# Patient Record
Sex: FEMALE | Race: BLACK OR AFRICAN AMERICAN | NOT HISPANIC OR LATINO | Employment: OTHER | URBAN - METROPOLITAN AREA
[De-identification: names, ages, dates, MRNs, and addresses within clinical notes are randomized per-mention and may not be internally consistent; named-entity substitution may affect disease eponyms.]

---

## 2017-01-16 ENCOUNTER — TELEPHONE (OUTPATIENT)
Dept: OPHTHALMOLOGY | Facility: CLINIC | Age: 72
End: 2017-01-16

## 2017-01-16 NOTE — TELEPHONE ENCOUNTER
Called insurance and conferred with Tania TEJEDA about a prior authorization for MsJeison Rory. Prior authorization is currently in the works, but Tania said prior auth. worked.

## 2017-01-17 ENCOUNTER — TELEPHONE (OUTPATIENT)
Dept: OPHTHALMOLOGY | Facility: CLINIC | Age: 72
End: 2017-01-17

## 2017-01-17 NOTE — TELEPHONE ENCOUNTER
----- Message from Cristina Machado sent at 1/17/2017  2:49 PM CST -----  Contact:  [facility] authorization   [facility] is juany Ute phone call. ...[phone] ref#[id number]

## 2017-01-17 NOTE — TELEPHONE ENCOUNTER
----- Message from Zandra Clarke sent at 1/17/2017  9:18 AM CST -----  Erica with [employer] at [phone]//ref # is [id number]//has questions regarding prescription for med/Xiidra//please call//thanks/[facility]

## 2017-01-18 ENCOUNTER — OFFICE VISIT (OUTPATIENT)
Dept: OPHTHALMOLOGY | Facility: CLINIC | Age: 72
End: 2017-01-18
Payer: MEDICARE

## 2017-01-18 DIAGNOSIS — H52.203 MYOPIA WITH ASTIGMATISM AND PRESBYOPIA, BILATERAL: Primary | ICD-10-CM

## 2017-01-18 DIAGNOSIS — H35.373 EPIRETINAL MEMBRANE, BILATERAL: ICD-10-CM

## 2017-01-18 DIAGNOSIS — H52.4 MYOPIA WITH ASTIGMATISM AND PRESBYOPIA, BILATERAL: Primary | ICD-10-CM

## 2017-01-18 DIAGNOSIS — H52.13 MYOPIA WITH ASTIGMATISM AND PRESBYOPIA, BILATERAL: Primary | ICD-10-CM

## 2017-01-18 PROCEDURE — 92015 DETERMINE REFRACTIVE STATE: CPT | Mod: ,,, | Performed by: OPTOMETRIST

## 2017-01-18 PROCEDURE — 99212 OFFICE O/P EST SF 10 MIN: CPT | Mod: PBBFAC,PO,25 | Performed by: OPTOMETRIST

## 2017-01-18 PROCEDURE — 92134 CPTRZ OPH DX IMG PST SGM RTA: CPT | Mod: PBBFAC,PO | Performed by: OPTOMETRIST

## 2017-01-18 PROCEDURE — 99999 PR PBB SHADOW E&M-EST. PATIENT-LVL II: CPT | Mod: PBBFAC,,, | Performed by: OPTOMETRIST

## 2017-01-18 PROCEDURE — 99499 UNLISTED E&M SERVICE: CPT | Mod: S$PBB,,, | Performed by: OPTOMETRIST

## 2017-01-18 RX ORDER — NALOXEGOL OXALATE 25 MG/1
TABLET, FILM COATED ORAL
COMMUNITY
Start: 2016-10-20 | End: 2019-11-12 | Stop reason: CLARIF

## 2017-01-18 RX ORDER — MISOPROSTOL 100 UG/1
TABLET ORAL
COMMUNITY
Start: 2016-12-23 | End: 2019-09-23

## 2017-01-18 RX ORDER — DICYCLOMINE HYDROCHLORIDE 10 MG/1
10 CAPSULE ORAL 3 TIMES DAILY
COMMUNITY
Start: 2016-10-24

## 2017-01-18 NOTE — TELEPHONE ENCOUNTER
[employer] called again. They just needed to know if the patient has ever tried restasis. I let them know it was previously listed in her medications.     Ute

## 2017-01-18 NOTE — PROGRESS NOTES
HPI     Pt here for 2-3 wk refraction. Pt has [arts of her vision missing in her   right eye. It's like something is in front of her vision.        Last edited by Stew Miranda, Patient Care Assistant on 1/18/2017 11:51   AM.         Assessment /Plan     For exam results, see Encounter Report.    Myopia with astigmatism and presbyopia, bilateral    Epiretinal membrane, bilateral  -     Posterior Segment OCT Retina-Both eyes    Eyeglass Final Rx     Eyeglass Final Rx      Sphere Cylinder Axis Add   Right -3.50 +2.00 085 +2.50   Left -2.50 +1.25 070 +2.50       Expiration Date:  1/19/2018              No changes on mOCT today compared with previous studies  RTC as scheduled with Dr babb for yearly exam, PRN sooner if any problems or concerns  Discussed above and all questions were answered.

## 2017-06-13 ENCOUNTER — OFFICE VISIT (OUTPATIENT)
Dept: OPHTHALMOLOGY | Facility: CLINIC | Age: 72
End: 2017-06-13
Payer: MEDICARE

## 2017-06-13 DIAGNOSIS — H04.123 DRY EYE SYNDROME, BILATERAL: ICD-10-CM

## 2017-06-13 DIAGNOSIS — H40.003 GLAUCOMA SUSPECT, BOTH EYES: ICD-10-CM

## 2017-06-13 DIAGNOSIS — E11.9 TYPE 2 DIABETES MELLITUS WITHOUT COMPLICATION, WITHOUT LONG-TERM CURRENT USE OF INSULIN: Primary | ICD-10-CM

## 2017-06-13 DIAGNOSIS — H35.371 EPIRETINAL MEMBRANE, RIGHT EYE: ICD-10-CM

## 2017-06-13 PROCEDURE — 99999 PR PBB SHADOW E&M-EST. PATIENT-LVL III: CPT | Mod: PBBFAC,,, | Performed by: OPHTHALMOLOGY

## 2017-06-13 PROCEDURE — 99213 OFFICE O/P EST LOW 20 MIN: CPT | Mod: PBBFAC,PO | Performed by: OPHTHALMOLOGY

## 2017-06-13 PROCEDURE — 92014 COMPRE OPH EXAM EST PT 1/>: CPT | Mod: S$PBB,,, | Performed by: OPHTHALMOLOGY

## 2017-06-13 NOTE — PROGRESS NOTES
===============================  06/14/2017   Faith Valadez,   71 y.o. female   Last visit [facility]: :8/8/2016   Last visit eye dept. 1/18/2017  [place]:  Corrected distance visual acuity was 20/100 in the right eye and 20/30 in the left eye.  Tonometry     Tonometry (Applanation, 2:51 PM)       Right Left    Pressure 16 16              Wearing Rx     Wearing Rx       Sphere Cylinder Axis Add    Right -3.50 +2.00 85 +2.75    Left -2.50 +1.25 85 +2.75    Type:  Trifocal              Manifest Refraction     Manifest Refraction       Sphere Cylinder [place] Dist VA    Right -3.50 +2.25 085 20/80    Left -2.25 +1.25 085 20/25              Chief Complaint   Patient presents with    Diabetic Eye Exam     YEARLY DIABETIC EXAM//  pt states that her eyes get crusty in the mornings and during the day        HPI     Diabetic Eye Exam    Additional comments: YEARLY DIABETIC EXAM//  pt states that her eyes get   crusty in the mornings and during the day           Comments   --Dm x since 2009  --erm od (per Dr. Lo If ever has ERM peel would be best to have cat   surg & iol 1st.)  Saw Dr. Lo also 2007 and patient decided against surgery  --Dry eyes  Punctal plugs (oasis) 6/16/14  restasis  --Ns ou  --pvd ou  --S. COAG based on ^c:d all diagnostics wnl (previously by Dr. Breaux)  cct 537/ 540   HVF 11/26/10  tmax 20 OU       Last edited by Malena Tai on 6/13/2017  2:42 PM. (History)      Read Studies:   Vitals  ________________  6/13/2017  Problem List Items Addressed This Visit        Eye/Vision problems    Epiretinal membrane, right eye    Dry eye syndrome - Both Eyes    Type 2 diabetes mellitus without complication - Primary       Other    Glaucoma suspect, both eyes      Other Visit Diagnoses    None.       .  1m ns   od erm no surgery  deecision 2007   rtc 1 year         ===========================

## 2017-11-30 ENCOUNTER — OFFICE VISIT (OUTPATIENT)
Dept: OPHTHALMOLOGY | Facility: CLINIC | Age: 72
End: 2017-11-30
Payer: MEDICARE

## 2017-11-30 DIAGNOSIS — H01.02B SQUAMOUS BLEPHARITIS OF BOTH UPPER AND LOWER EYELID OF LEFT EYE: Primary | ICD-10-CM

## 2017-11-30 DIAGNOSIS — H52.13 MYOPIA WITH ASTIGMATISM AND PRESBYOPIA, BILATERAL: ICD-10-CM

## 2017-11-30 DIAGNOSIS — H52.203 MYOPIA WITH ASTIGMATISM AND PRESBYOPIA, BILATERAL: ICD-10-CM

## 2017-11-30 DIAGNOSIS — H04.123 DRY EYE SYNDROME, BILATERAL: ICD-10-CM

## 2017-11-30 DIAGNOSIS — H52.4 MYOPIA WITH ASTIGMATISM AND PRESBYOPIA, BILATERAL: ICD-10-CM

## 2017-11-30 PROCEDURE — 99999 PR PBB SHADOW E&M-EST. PATIENT-LVL II: CPT | Mod: PBBFAC,,, | Performed by: OPTOMETRIST

## 2017-11-30 PROCEDURE — 92015 DETERMINE REFRACTIVE STATE: CPT | Mod: ,,, | Performed by: OPTOMETRIST

## 2017-11-30 PROCEDURE — 92012 INTRM OPH EXAM EST PATIENT: CPT | Mod: S$PBB,,, | Performed by: OPTOMETRIST

## 2017-11-30 PROCEDURE — 99212 OFFICE O/P EST SF 10 MIN: CPT | Mod: PBBFAC,PO | Performed by: OPTOMETRIST

## 2017-11-30 RX ORDER — OXYBUTYNIN CHLORIDE 5 MG/1
TABLET ORAL
COMMUNITY
Start: 2017-09-21 | End: 2018-09-21

## 2017-11-30 RX ORDER — PROMETHAZINE HYDROCHLORIDE AND DEXTROMETHORPHAN HYDROBROMIDE 6.25; 15 MG/5ML; MG/5ML
SYRUP ORAL
COMMUNITY
Start: 2017-11-10 | End: 2018-09-21

## 2017-11-30 RX ORDER — SOLIFENACIN SUCCINATE 5 MG/1
TABLET, FILM COATED ORAL
COMMUNITY
Start: 2017-11-06 | End: 2019-11-12 | Stop reason: CLARIF

## 2017-11-30 RX ORDER — TOBRAMYCIN AND DEXAMETHASONE 3; 1 MG/ML; MG/ML
1 SUSPENSION/ DROPS OPHTHALMIC 3 TIMES DAILY
Qty: 1 BOTTLE | Refills: 0 | Status: SHIPPED | OUTPATIENT
Start: 2017-11-30 | End: 2017-12-07

## 2017-11-30 RX ORDER — VALACYCLOVIR HYDROCHLORIDE 1 G/1
TABLET, FILM COATED ORAL
COMMUNITY
Start: 2017-11-10 | End: 2019-09-23

## 2017-11-30 NOTE — PROGRESS NOTES
HPI     Eye Problem    Additional comments: Itchy, irritated, red, tearing eyes           Comments   Last seen by [facility] on 6/13/17 for yearly DM exam. Patient here today c/o   red, irritated, tearing, itchy eyes cause her vision to be blurred.   Patient states she has shingles on her back.   1. Dry Eyes OU  Pain Scale:  0-1  Onset:   2 months  OD, OS, OU:   OU OS>OD  Discharge:   Yes  A.M. Matting:  No  Itch:   Yes  Redness:   Yes  Photophobia:   No  Foreign body sensation:   No  Deep pain:   No  Previous occurrence:   Yes  Drops:   Xiidra       Last edited by Cami George, PCT on 11/30/2017 10:15 AM.   (History)            Assessment /Plan     For exam results, see Encounter Report.    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  Dispense: 1 Bottle; Refill: 0  Blepharoconjunctivitis OS  Start tobradex tid OS x 1 week, then d/c  RTC or call if symptoms worsen or if not resolved x 1 week    Dry eye syndrome, bilateral  -     lifitegrast 5 % Dpet; Place 1 drop into both eyes 2 (two) times daily.  Dispense: 60 each; Refill: 6    Myopia with astigmatism and presbyopia, bilateral  Eyeglass Final Rx     Eyeglass Final Rx       Sphere Cylinder Axis Dist VA Add    Right -3.50 +2.00 090 20/80-1 +2.75    Left -2.50 +1.25 085 20/25 +2.75    Type:  Trifocal    Expiration Date:  12/1/2018              RTC PRN or as scheduled  Discussed above and all questions were answered.

## 2018-09-21 ENCOUNTER — OFFICE VISIT (OUTPATIENT)
Dept: OPHTHALMOLOGY | Facility: CLINIC | Age: 73
End: 2018-09-21
Payer: MEDICARE

## 2018-09-21 DIAGNOSIS — E11.9 TYPE 2 DIABETES MELLITUS WITHOUT COMPLICATION, WITHOUT LONG-TERM CURRENT USE OF INSULIN: Primary | ICD-10-CM

## 2018-09-21 PROCEDURE — 99999 PR PBB SHADOW E&M-EST. PATIENT-LVL II: CPT | Mod: PBBFAC,,, | Performed by: OPHTHALMOLOGY

## 2018-09-21 PROCEDURE — 92014 COMPRE OPH EXAM EST PT 1/>: CPT | Mod: S$PBB,,, | Performed by: OPHTHALMOLOGY

## 2018-09-21 PROCEDURE — 99212 OFFICE O/P EST SF 10 MIN: CPT | Mod: PBBFAC,PO | Performed by: OPHTHALMOLOGY

## 2018-09-21 RX ORDER — LIDOCAINE 50 MG/G
PATCH TOPICAL
COMMUNITY
Start: 2018-07-01

## 2018-09-21 NOTE — PROGRESS NOTES
===============================  09/21/2018   Faith Valadez,   73 y.o. female   Last visit [facility]: :6/13/2017   Last visit eye dept. Visit date not found  [place]:  Corrected distance visual acuity was 20/50 in the right eye and 20/25 in the left eye.  Tonometry     Tonometry (Applanation, 8:59 AM)       Right Left    Pressure 17 16              Wearing Rx     Wearing Rx       Sphere Cylinder Axis Add    Right +3.75 +1.50 080 +2.75    Left -3.00 +1.25 090 +2.75    Type:  Trifocal               Not recorded        Chief Complaint   Patient presents with    Diabetes     YEARLY EXAM    ERM        HPI     Diabetes      Additional comments: YEARLY EXAM              Comments     --Dm x since 2009  --erm od (per Dr. Lo If ever has ERM peel would be best to have cat   surg & iol 1st.)  Saw Dr. Lo also 2007 and patient decided against surgery  --Dry eyes  Punctal plugs (oasis) 6/16/14  restasis  --Ns ou  --pvd ou  --S. COAG based on ^c:d all diagnostics wnl (previously by Dr. Breaux)  cct 537/ 540   HVF 11/26/10  tmax 20 OU          Last edited by Malena Tai on 9/21/2018  8:45 AM. (History)          ________________  9/21/2018  Problem List Items Addressed This Visit        Eye/Vision problems    Type 2 diabetes mellitus without complication - Primary        od erm stable    .sytomtioc    Near symptoms  ERM unchanged OD  1+ NSC OU, follow  rx change  rtc 1 year         ===========================

## 2019-05-29 ENCOUNTER — OFFICE VISIT (OUTPATIENT)
Dept: OPHTHALMOLOGY | Facility: CLINIC | Age: 74
End: 2019-05-29
Payer: MEDICARE

## 2019-05-29 DIAGNOSIS — H04.123 DRY EYE SYNDROME, BILATERAL: Primary | ICD-10-CM

## 2019-05-29 PROCEDURE — 99999 PR PBB SHADOW E&M-EST. PATIENT-LVL II: ICD-10-PCS | Mod: PBBFAC,,, | Performed by: OPTOMETRIST

## 2019-05-29 PROCEDURE — 92012 PR EYE EXAM, EST PATIENT,INTERMED: ICD-10-PCS | Mod: S$PBB,,, | Performed by: OPTOMETRIST

## 2019-05-29 PROCEDURE — 99999 PR PBB SHADOW E&M-EST. PATIENT-LVL II: CPT | Mod: PBBFAC,,, | Performed by: OPTOMETRIST

## 2019-05-29 PROCEDURE — 99212 OFFICE O/P EST SF 10 MIN: CPT | Mod: PBBFAC,PN | Performed by: OPTOMETRIST

## 2019-05-29 PROCEDURE — 92012 INTRM OPH EXAM EST PATIENT: CPT | Mod: S$PBB,,, | Performed by: OPTOMETRIST

## 2019-05-29 RX ORDER — LOTEPREDNOL ETABONATE 5 MG/ML
1 SUSPENSION/ DROPS OPHTHALMIC 3 TIMES DAILY
Qty: 5 ML | Refills: 1 | Status: SHIPPED | OUTPATIENT
Start: 2019-05-29 | End: 2019-06-12

## 2019-05-29 NOTE — PROGRESS NOTES
HPI     Last [facility] exam 09/21/2018  Chief complaint: irritation OU   Onset: 1 month ago  Left eye is red   Dry   No watering  No matting in the AM   Blurred vision   Has been using Xiidra but is out and needs a refill  Patient is currently using Thera Tears             Dry eyes   punctal plugs   Diabetic/NIDDM    She has been treated by Ricky for dry eye with xiidra, restasis,   lotemax, and AT.    Last edited by ADRIAN Lott, OD on 5/29/2019  9:34 AM. (History)            Assessment /Plan     For exam results, see Encounter Report.    Dry eye syndrome, bilateral  -     lifitegrast (XIIDRA) 5 % Dpet; Place 1 drop into both eyes 2 (two) times daily.  Dispense: 60 each; Refill: 6  -     loteprednol (LOTEMAX) 0.5 % ophthalmic suspension; Place 1 drop into both eyes 3 (three) times daily. for 14 days  Dispense: 5 mL; Refill: 1      Dry eye with PEK.  Drops above as Rxed.  RTC prn.

## 2019-07-10 ENCOUNTER — OFFICE VISIT (OUTPATIENT)
Dept: OPHTHALMOLOGY | Facility: CLINIC | Age: 74
End: 2019-07-10
Payer: MEDICARE

## 2019-07-10 DIAGNOSIS — H04.123 DRY EYE SYNDROME, BILATERAL: Primary | ICD-10-CM

## 2019-07-10 PROCEDURE — 92012 PR EYE EXAM, EST PATIENT,INTERMED: ICD-10-PCS | Mod: S$PBB,,, | Performed by: OPTOMETRIST

## 2019-07-10 PROCEDURE — 99999 PR PBB SHADOW E&M-EST. PATIENT-LVL II: ICD-10-PCS | Mod: PBBFAC,,, | Performed by: OPTOMETRIST

## 2019-07-10 PROCEDURE — 92012 INTRM OPH EXAM EST PATIENT: CPT | Mod: S$PBB,,, | Performed by: OPTOMETRIST

## 2019-07-10 PROCEDURE — 99212 OFFICE O/P EST SF 10 MIN: CPT | Mod: PBBFAC | Performed by: OPTOMETRIST

## 2019-07-10 PROCEDURE — 99999 PR PBB SHADOW E&M-EST. PATIENT-LVL II: CPT | Mod: PBBFAC,,, | Performed by: OPTOMETRIST

## 2019-07-10 NOTE — PROGRESS NOTES
HPI     Last seen by [facility] on 5/29/19 for Dry eye OU  Patient here today for Dry eye OU  Symptoms have been present for several months  Left eye is red  No tearing  No matting  Used Xiidra and Lotemax Patient states both drops are too expensive  Patient states she uses B&L drops    Dry eyes  Punctal plugs  DM    Last edited by Cami George, PCT on 7/10/2019  8:19 AM. (History)              Assessment /Plan     For exam results, see Encounter Report.    Dry eye syndrome, bilateral      Try Genteal Gel drops qid OU.  RTC to DNL if s/s persist.

## 2019-09-23 ENCOUNTER — OFFICE VISIT (OUTPATIENT)
Dept: OPHTHALMOLOGY | Facility: CLINIC | Age: 74
End: 2019-09-23
Payer: MEDICARE

## 2019-09-23 ENCOUNTER — TELEPHONE (OUTPATIENT)
Dept: OPHTHALMOLOGY | Facility: CLINIC | Age: 74
End: 2019-09-23

## 2019-09-23 DIAGNOSIS — H35.371 EPIRETINAL MEMBRANE, RIGHT EYE: Primary | ICD-10-CM

## 2019-09-23 DIAGNOSIS — E11.9 TYPE 2 DIABETES MELLITUS WITHOUT COMPLICATION, WITHOUT LONG-TERM CURRENT USE OF INSULIN: ICD-10-CM

## 2019-09-23 PROCEDURE — 99999 PR PBB SHADOW E&M-EST. PATIENT-LVL II: ICD-10-PCS | Mod: PBBFAC,,, | Performed by: OPHTHALMOLOGY

## 2019-09-23 PROCEDURE — 92014 PR EYE EXAM, EST PATIENT,COMPREHESV: ICD-10-PCS | Mod: S$PBB,,, | Performed by: OPHTHALMOLOGY

## 2019-09-23 PROCEDURE — 99999 PR PBB SHADOW E&M-EST. PATIENT-LVL II: CPT | Mod: PBBFAC,,, | Performed by: OPHTHALMOLOGY

## 2019-09-23 PROCEDURE — 92014 COMPRE OPH EXAM EST PT 1/>: CPT | Mod: S$PBB,,, | Performed by: OPHTHALMOLOGY

## 2019-09-23 PROCEDURE — 99212 OFFICE O/P EST SF 10 MIN: CPT | Mod: PBBFAC | Performed by: OPHTHALMOLOGY

## 2019-09-23 RX ORDER — METOPROLOL SUCCINATE 100 MG/1
100 TABLET, EXTENDED RELEASE ORAL 2 TIMES DAILY
Refills: 11 | COMMUNITY
Start: 2019-07-07 | End: 2023-02-27

## 2019-09-23 RX ORDER — ROSUVASTATIN CALCIUM 40 MG/1
40 TABLET, COATED ORAL NIGHTLY
Refills: 3 | COMMUNITY
Start: 2019-07-06 | End: 2024-01-11

## 2019-09-23 NOTE — TELEPHONE ENCOUNTER
----- Message from EVE Rey MD sent at 9/23/2019 10:55 AM CDT -----  Please contact Ms. Valadez for consult.  Thank you.

## 2019-09-23 NOTE — PROGRESS NOTES
===============================  Faith Valadez,   74 y.o. female   Last visit [facility]: :9/21/2018   Last visit eye dept. 7/10/2019  [place]:  Corrected distance visual acuity was 20/100 in the right eye and 20/25 in the left eye.  Tonometry     Tonometry (Applanation, 9:56 AM)       Right Left    Pressure 11 15               Not recorded        Manifest Refraction     Manifest Refraction       Sphere Cylinder [place] Dist VA    Right -4.00 +1.50 080 20/80    Left -1.75 +1.25 090 20/20               Not recorded        Chief Complaint   Patient presents with    Diabetes     YEARLY EXAM    ERM          ________________  9/23/2019  HPI     Diabetes      Additional comments: YEARLY EXAM              Comments     --Dm x since 2009  --erm od (per Dr. Lo If ever has ERM peel would be best to have cat   surg & iol 1st.)  Saw Dr. Lo also 2007 and patient decided against surgery  --Dry eyes  Punctal plugs (oasis) 6/16/14  restasis  --Ns ou  --pvd ou  --S. COAG based on ^c:d all diagnostics wnl (previously by Dr. Breaux)  cct 537/ 540   HVF 11/26/10  tmax 20 OU          Last edited by Malena Tai on 9/23/2019  9:37 AM. (History)      Problem List Items Addressed This Visit        Eye/Vision problems    Epiretinal membrane, right eye - Primary  --Patient is symptomatic and would now like surgery  Recommend consult with Dr. Corea    Type 2 diabetes mellitus without complication  --no bdr        od marked erm    worse   os ok  min ns  Sl worse closes od most of the day   consut dr lombardo    Requesting papers filled out for disability  Explained she is not visually disabled    .       ===========================

## 2019-10-30 ENCOUNTER — TELEPHONE (OUTPATIENT)
Dept: OPHTHALMOLOGY | Facility: CLINIC | Age: 74
End: 2019-10-30

## 2019-10-30 ENCOUNTER — OFFICE VISIT (OUTPATIENT)
Dept: OPHTHALMOLOGY | Facility: CLINIC | Age: 74
End: 2019-10-30
Payer: MEDICARE

## 2019-10-30 DIAGNOSIS — H35.371 RIGHT EPIRETINAL MEMBRANE: Primary | ICD-10-CM

## 2019-10-30 DIAGNOSIS — H43.813 POSTERIOR VITREOUS DETACHMENT OF BOTH EYES: ICD-10-CM

## 2019-10-30 DIAGNOSIS — H35.371 EPIRETINAL MEMBRANE, RIGHT EYE: Primary | ICD-10-CM

## 2019-10-30 PROCEDURE — 99999 PR PBB SHADOW E&M-EST. PATIENT-LVL II: CPT | Mod: PBBFAC,,, | Performed by: OPHTHALMOLOGY

## 2019-10-30 PROCEDURE — 92014 COMPRE OPH EXAM EST PT 1/>: CPT | Mod: S$PBB,,, | Performed by: OPHTHALMOLOGY

## 2019-10-30 PROCEDURE — 92014 PR EYE EXAM, EST PATIENT,COMPREHESV: ICD-10-PCS | Mod: S$PBB,,, | Performed by: OPHTHALMOLOGY

## 2019-10-30 PROCEDURE — 99212 OFFICE O/P EST SF 10 MIN: CPT | Mod: PBBFAC,25 | Performed by: OPHTHALMOLOGY

## 2019-10-30 PROCEDURE — 92134 CPTRZ OPH DX IMG PST SGM RTA: CPT | Mod: PBBFAC | Performed by: OPHTHALMOLOGY

## 2019-10-30 PROCEDURE — 99999 PR PBB SHADOW E&M-EST. PATIENT-LVL II: ICD-10-PCS | Mod: PBBFAC,,, | Performed by: OPHTHALMOLOGY

## 2019-10-30 PROCEDURE — 92134 POSTERIOR SEGMENT OCT RETINA (OCULAR COHERENCE TOMOGRAPHY)-BOTH EYES: ICD-10-PCS | Mod: 26,S$PBB,, | Performed by: OPHTHALMOLOGY

## 2019-10-30 PROCEDURE — 92225 PR SPECIAL EYE EXAM, INITIAL: ICD-10-PCS | Mod: 50,S$PBB,, | Performed by: OPHTHALMOLOGY

## 2019-10-30 PROCEDURE — 92225 PR SPECIAL EYE EXAM, INITIAL: CPT | Mod: 50,PBBFAC | Performed by: OPHTHALMOLOGY

## 2019-10-30 PROCEDURE — 92225 PR SPECIAL EYE EXAM, INITIAL: CPT | Mod: 50,S$PBB,, | Performed by: OPHTHALMOLOGY

## 2019-10-30 RX ORDER — NYSTATIN 100000 U/G
CREAM TOPICAL
COMMUNITY
Start: 2018-01-31

## 2019-10-30 RX ORDER — ACETAMINOPHEN 500 MG
5000 TABLET ORAL DAILY
COMMUNITY

## 2019-10-30 RX ORDER — AZELASTINE HYDROCHLORIDE, FLUTICASONE PROPIONATE 137; 50 UG/1; UG/1
2 SPRAY, METERED NASAL 2 TIMES DAILY PRN
COMMUNITY

## 2019-10-30 RX ORDER — NAPROXEN SODIUM 220 MG/1
81 TABLET, FILM COATED ORAL DAILY
COMMUNITY

## 2019-10-30 NOTE — PROGRESS NOTES
HPI     Diabetes      Additional comments: consult per               Comments     --Dm x since 2009  --erm od (per Dr. Lo If ever has ERM peel would be best to have cat   surg & iol 1st.)  Saw Dr. Lo also 2007 and patient decided against surgery  --Dry eyes  Punctal plugs (oasis) 6/16/14  restasis  --Ns ou  --pvd ou  --S. COAG based on ^c:d all diagnostics wnl (previously by Dr. Breaux)  cct 537/ 540   HVF 11/26/10  tmax 20 OU          Last edited by Malena Tai on 10/30/2019 10:01 AM. (History)        OCT - ERM OD with foveal distortion      A/P    1. ERM OD  Significant macular distortion with worsening Va from 20/50 to 20/200 over last year     Would benefit from PPV to get to 20/50 level again    Plan 25g PPV/ILM peel/AFx OD for ERM    Local MAC  LOC 40 min    Risks, benefits, and alternatives to treatment discussed in detail with the patient.  The patient voiced understanding and wished to proceed with the procedure    2. NS OU  Will need CE OD shortly after PPV    3. HTN Ret OU  BP control      TO OR

## 2019-11-12 RX ORDER — OXYBUTYNIN CHLORIDE 5 MG/1
5 TABLET, EXTENDED RELEASE ORAL DAILY
COMMUNITY

## 2019-11-12 NOTE — H&P
"Pre-Operative History & Physical  Ophthalmology      SUBJECTIVE:     History of Present Illness:  Patient is a 74 y.o. female presents with Right epiretinal membrane [H35.371].    MEDICATIONS:   No medications prior to admission.       ALLERGIES:   Review of patient's allergies indicates:   Allergen Reactions    Codeine Shortness Of Breath, Nausea Only and Rash     Reports throat "tightening".    Gabapentin Hallucinations    Amoxicillin Hives    Latex, natural rubber Itching and Rash    Penicillins Hives       PAST MEDICAL HISTORY:   Past Medical History:   Diagnosis Date    Arthritis     Cataract     Diabetes mellitus     Hypertension     Open angle with borderline findings, low risk 5/25/2015     PAST SURGICAL HISTORY: No past surgical history on file.  PAST FAMILY HISTORY:   Family History   Problem Relation Age of Onset    Glaucoma Father     Cataracts Father     Cancer Father     Diabetes Sister     Cataracts Sister     Cancer Sister     Hypertension Sister     Thyroid disease Brother     Thyroid disease Maternal Aunt     Hypertension Maternal Uncle     Stroke Maternal Grandmother     Diabetes Maternal Grandfather     Stroke Maternal Grandfather     Thyroid disease Paternal Grandmother     Glaucoma Son     Amblyopia Neg Hx     Blindness Neg Hx     Strabismus Neg Hx     Retinal detachment Neg Hx     Macular degeneration Neg Hx      SOCIAL HISTORY:   Social History     Tobacco Use    Smoking status: Never Smoker   Substance Use Topics    Alcohol use: No    Drug use: Not on file        MENTAL STATUS: Alert    REVIEW OF SYSTEMS: Negative    OBJECTIVE:     Vital Signs (Most Recent)       Physical Exam:  General: NAD  HEENT: Atraumatic  Lungs: Adequate respirations, LCTAB  Heart: RRR, No murmur  Abdomen: Soft NT    ASSESSMENT/PLAN:     Patient is a 74 y.o. female with Right epiretinal membrane [H35.371].     - Plan for surgical correction 25g PPV/ILM peel/AFx OD for ERM OD     Local " MAC  LOC 40 min   - Risks/benefits/alternatives of the procedure including, but not limited to scarring, bleeding, infection, loss or decreased vision, and/or need for possible repeat surgery discussed with the patient and family.   - Informed consent obtained prior to surgery and the patient/family voiced good understanding.    Chris Streeter  11/12/2019  4:15 PM

## 2019-11-12 NOTE — PRE-PROCEDURE INSTRUCTIONS
Preop instructions:     NPO instructions: NO solids foods,non-clear liquids including milk, milk products, creamers, gum, mints, or hard candy after midnight the night prior to your surgery.     We encourage a limited consumption of CLEAR LIQUIDS after midnight the night before your surgery.     Acceptable Clear Liquids are listed below:    Water, SUGAR-FREE Gatorade/Powerade sports drinks,  Black coffee with without sugar/NO milk, cream or creamer or Jello.     If you have received specific instructions from your Surgeon/Surgeon's staff, please follow their instructions.     Showering instructions, directions, leave all valuables at home, medication instructions for PM prior & am of procedure explained. Patient stated an understanding.      Patient denies any side effects or issues with anesthesia or sedation.                                                                                                                                    Patient does not know arrival time. Explained that this information comes from the surgeons office and if they have not heard from them by 2 pm, to call office. Patient stated an understanding.

## 2019-11-13 ENCOUNTER — ANESTHESIA EVENT (OUTPATIENT)
Dept: SURGERY | Facility: HOSPITAL | Age: 74
End: 2019-11-13
Payer: MEDICARE

## 2019-11-13 ENCOUNTER — HOSPITAL ENCOUNTER (OUTPATIENT)
Facility: HOSPITAL | Age: 74
Discharge: HOME | End: 2019-11-13
Attending: OPHTHALMOLOGY | Admitting: OPHTHALMOLOGY
Payer: MEDICARE

## 2019-11-13 ENCOUNTER — ANESTHESIA (OUTPATIENT)
Dept: SURGERY | Facility: HOSPITAL | Age: 74
End: 2019-11-13
Payer: MEDICARE

## 2019-11-13 VITALS
OXYGEN SATURATION: 98 % | DIASTOLIC BLOOD PRESSURE: 66 MMHG | RESPIRATION RATE: 16 BRPM | HEART RATE: 57 BPM | SYSTOLIC BLOOD PRESSURE: 133 MMHG | HEIGHT: 62 IN | WEIGHT: 196 LBS | TEMPERATURE: 98 F | BODY MASS INDEX: 36.07 KG/M2

## 2019-11-13 DIAGNOSIS — H35.371 EPIRETINAL MEMBRANE, RIGHT EYE: Primary | ICD-10-CM

## 2019-11-13 LAB
POCT GLUCOSE: 79 MG/DL (ref 70–110)
POCT GLUCOSE: 89 MG/DL (ref 70–110)

## 2019-11-13 PROCEDURE — D9220A PRA ANESTHESIA: ICD-10-PCS | Mod: CRNA,,, | Performed by: NURSE ANESTHETIST, CERTIFIED REGISTERED

## 2019-11-13 PROCEDURE — 37000009 HC ANESTHESIA EA ADD 15 MINS: Performed by: OPHTHALMOLOGY

## 2019-11-13 PROCEDURE — 63600175 PHARM REV CODE 636 W HCPCS: Performed by: NURSE ANESTHETIST, CERTIFIED REGISTERED

## 2019-11-13 PROCEDURE — 67042 VIT FOR MACULAR HOLE: CPT | Mod: RT,GC,, | Performed by: OPHTHALMOLOGY

## 2019-11-13 PROCEDURE — 27201423 OPTIME MED/SURG SUP & DEVICES STERILE SUPPLY: Performed by: OPHTHALMOLOGY

## 2019-11-13 PROCEDURE — 82962 GLUCOSE BLOOD TEST: CPT | Performed by: OPHTHALMOLOGY

## 2019-11-13 PROCEDURE — 63600175 PHARM REV CODE 636 W HCPCS: Performed by: OPHTHALMOLOGY

## 2019-11-13 PROCEDURE — S0020 INJECTION, BUPIVICAINE HYDRO: HCPCS | Performed by: OPHTHALMOLOGY

## 2019-11-13 PROCEDURE — 36000707: Performed by: OPHTHALMOLOGY

## 2019-11-13 PROCEDURE — 36000706: Performed by: OPHTHALMOLOGY

## 2019-11-13 PROCEDURE — 25000003 PHARM REV CODE 250

## 2019-11-13 PROCEDURE — 71000015 HC POSTOP RECOV 1ST HR: Performed by: OPHTHALMOLOGY

## 2019-11-13 PROCEDURE — D9220A PRA ANESTHESIA: Mod: ANES,,, | Performed by: ANESTHESIOLOGY

## 2019-11-13 PROCEDURE — 67042 PR VITRECTOMY PARS PLANA REMOVE INT MEMB RETINA: ICD-10-PCS | Mod: RT,GC,, | Performed by: OPHTHALMOLOGY

## 2019-11-13 PROCEDURE — 25000003 PHARM REV CODE 250: Performed by: OPHTHALMOLOGY

## 2019-11-13 PROCEDURE — 82962 GLUCOSE BLOOD TEST: CPT | Mod: 91 | Performed by: OPHTHALMOLOGY

## 2019-11-13 PROCEDURE — D9220A PRA ANESTHESIA: Mod: CRNA,,, | Performed by: NURSE ANESTHETIST, CERTIFIED REGISTERED

## 2019-11-13 PROCEDURE — 37000008 HC ANESTHESIA 1ST 15 MINUTES: Performed by: OPHTHALMOLOGY

## 2019-11-13 PROCEDURE — D9220A PRA ANESTHESIA: ICD-10-PCS | Mod: ANES,,, | Performed by: ANESTHESIOLOGY

## 2019-11-13 RX ORDER — ACETAMINOPHEN 325 MG/1
650 TABLET ORAL EVERY 4 HOURS PRN
Status: DISCONTINUED | OUTPATIENT
Start: 2019-11-13 | End: 2019-11-13 | Stop reason: HOSPADM

## 2019-11-13 RX ORDER — EPINEPHRINE 1 MG/ML
INJECTION, SOLUTION INTRACARDIAC; INTRAMUSCULAR; INTRAVENOUS; SUBCUTANEOUS
Status: DISCONTINUED
Start: 2019-11-13 | End: 2019-11-13 | Stop reason: HOSPADM

## 2019-11-13 RX ORDER — TETRACAINE HYDROCHLORIDE 5 MG/ML
1 SOLUTION OPHTHALMIC
Status: DISCONTINUED | OUTPATIENT
Start: 2019-11-13 | End: 2019-11-13 | Stop reason: HOSPADM

## 2019-11-13 RX ORDER — PHENYLEPHRINE HYDROCHLORIDE 25 MG/ML
1 SOLUTION/ DROPS OPHTHALMIC
Status: DISCONTINUED | OUTPATIENT
Start: 2019-11-13 | End: 2019-11-13 | Stop reason: HOSPADM

## 2019-11-13 RX ORDER — BUPIVACAINE HYDROCHLORIDE 7.5 MG/ML
INJECTION, SOLUTION EPIDURAL; RETROBULBAR
Status: DISCONTINUED
Start: 2019-11-13 | End: 2019-11-13 | Stop reason: HOSPADM

## 2019-11-13 RX ORDER — TETRACAINE HYDROCHLORIDE 5 MG/ML
SOLUTION OPHTHALMIC
Status: COMPLETED
Start: 2019-11-13 | End: 2019-11-13

## 2019-11-13 RX ORDER — PHENYLEPHRINE HYDROCHLORIDE 25 MG/ML
SOLUTION/ DROPS OPHTHALMIC
Status: COMPLETED
Start: 2019-11-13 | End: 2019-11-13

## 2019-11-13 RX ORDER — LIDOCAINE HYDROCHLORIDE 10 MG/ML
1 INJECTION, SOLUTION EPIDURAL; INFILTRATION; INTRACAUDAL; PERINEURAL ONCE
Status: DISCONTINUED | OUTPATIENT
Start: 2019-11-13 | End: 2019-11-13 | Stop reason: HOSPADM

## 2019-11-13 RX ORDER — INDOCYANINE GREEN AND WATER 25 MG
KIT INJECTION
Status: DISCONTINUED | OUTPATIENT
Start: 2019-11-13 | End: 2019-11-13 | Stop reason: HOSPADM

## 2019-11-13 RX ORDER — PROPOFOL 10 MG/ML
VIAL (ML) INTRAVENOUS
Status: DISCONTINUED | OUTPATIENT
Start: 2019-11-13 | End: 2019-11-13

## 2019-11-13 RX ORDER — HYDROCODONE BITARTRATE AND ACETAMINOPHEN 5; 325 MG/1; MG/1
1 TABLET ORAL EVERY 4 HOURS PRN
Status: DISCONTINUED | OUTPATIENT
Start: 2019-11-13 | End: 2019-11-13 | Stop reason: HOSPADM

## 2019-11-13 RX ORDER — SODIUM CHLORIDE 9 MG/ML
INJECTION, SOLUTION INTRAVENOUS CONTINUOUS PRN
Status: DISCONTINUED | OUTPATIENT
Start: 2019-11-13 | End: 2019-11-13

## 2019-11-13 RX ORDER — LIDOCAINE HCL/PF 100 MG/5ML
SYRINGE (ML) INTRAVENOUS
Status: DISCONTINUED | OUTPATIENT
Start: 2019-11-13 | End: 2019-11-13

## 2019-11-13 RX ORDER — OXYCODONE AND ACETAMINOPHEN 5; 325 MG/1; MG/1
1 TABLET ORAL EVERY 6 HOURS PRN
Qty: 12 TABLET | Refills: 0 | Status: SHIPPED | OUTPATIENT
Start: 2019-11-13 | End: 2021-03-01

## 2019-11-13 RX ORDER — ONDANSETRON 4 MG/1
4 TABLET, FILM COATED ORAL EVERY 8 HOURS PRN
Qty: 12 TABLET | Refills: 0 | Status: SHIPPED | OUTPATIENT
Start: 2019-11-13

## 2019-11-13 RX ORDER — VANCOMYCIN HYDROCHLORIDE 500 MG/10ML
INJECTION, POWDER, LYOPHILIZED, FOR SOLUTION INTRAVENOUS
Status: DISCONTINUED | OUTPATIENT
Start: 2019-11-13 | End: 2019-11-13 | Stop reason: HOSPADM

## 2019-11-13 RX ORDER — LIDOCAINE HYDROCHLORIDE 20 MG/ML
INJECTION, SOLUTION EPIDURAL; INFILTRATION; INTRACAUDAL; PERINEURAL
Status: DISCONTINUED
Start: 2019-11-13 | End: 2019-11-13 | Stop reason: HOSPADM

## 2019-11-13 RX ORDER — CYCLOPENTOLATE HYDROCHLORIDE 10 MG/ML
1 SOLUTION/ DROPS OPHTHALMIC
Status: DISCONTINUED | OUTPATIENT
Start: 2019-11-13 | End: 2019-11-13 | Stop reason: HOSPADM

## 2019-11-13 RX ORDER — SODIUM CHLORIDE 0.9 % (FLUSH) 0.9 %
10 SYRINGE (ML) INJECTION
Status: DISCONTINUED | OUTPATIENT
Start: 2019-11-13 | End: 2019-11-13 | Stop reason: HOSPADM

## 2019-11-13 RX ORDER — CYCLOPENTOLATE HYDROCHLORIDE 10 MG/ML
SOLUTION/ DROPS OPHTHALMIC
Status: COMPLETED
Start: 2019-11-13 | End: 2019-11-13

## 2019-11-13 RX ORDER — DEXAMETHASONE SODIUM PHOSPHATE 4 MG/ML
INJECTION, SOLUTION INTRA-ARTICULAR; INTRALESIONAL; INTRAMUSCULAR; INTRAVENOUS; SOFT TISSUE
Status: DISCONTINUED | OUTPATIENT
Start: 2019-11-13 | End: 2019-11-13 | Stop reason: HOSPADM

## 2019-11-13 RX ORDER — PREDNISOLONE ACETATE 10 MG/ML
1 SUSPENSION/ DROPS OPHTHALMIC
Status: DISCONTINUED | OUTPATIENT
Start: 2019-11-13 | End: 2019-11-13 | Stop reason: HOSPADM

## 2019-11-13 RX ORDER — ONDANSETRON 8 MG/1
8 TABLET, ORALLY DISINTEGRATING ORAL EVERY 8 HOURS PRN
Status: DISCONTINUED | OUTPATIENT
Start: 2019-11-13 | End: 2019-11-13 | Stop reason: HOSPADM

## 2019-11-13 RX ORDER — MOXIFLOXACIN 5 MG/ML
1 SOLUTION/ DROPS OPHTHALMIC
Status: DISCONTINUED | OUTPATIENT
Start: 2019-11-13 | End: 2019-11-13 | Stop reason: HOSPADM

## 2019-11-13 RX ORDER — VANCOMYCIN HYDROCHLORIDE 500 MG/10ML
INJECTION, POWDER, LYOPHILIZED, FOR SOLUTION INTRAVENOUS
Status: DISCONTINUED
Start: 2019-11-13 | End: 2019-11-13 | Stop reason: HOSPADM

## 2019-11-13 RX ORDER — PREDNISOLONE ACETATE 10 MG/ML
SUSPENSION/ DROPS OPHTHALMIC
Status: COMPLETED
Start: 2019-11-13 | End: 2019-11-13

## 2019-11-13 RX ORDER — BUPIVACAINE HYDROCHLORIDE 7.5 MG/ML
INJECTION, SOLUTION EPIDURAL; RETROBULBAR
Status: DISCONTINUED | OUTPATIENT
Start: 2019-11-13 | End: 2019-11-13 | Stop reason: HOSPADM

## 2019-11-13 RX ORDER — MOXIFLOXACIN 5 MG/ML
SOLUTION/ DROPS OPHTHALMIC
Status: COMPLETED
Start: 2019-11-13 | End: 2019-11-13

## 2019-11-13 RX ORDER — TROPICAMIDE 10 MG/ML
SOLUTION/ DROPS OPHTHALMIC
Status: COMPLETED
Start: 2019-11-13 | End: 2019-11-13

## 2019-11-13 RX ORDER — TROPICAMIDE 10 MG/ML
1 SOLUTION/ DROPS OPHTHALMIC
Status: DISCONTINUED | OUTPATIENT
Start: 2019-11-13 | End: 2019-11-13 | Stop reason: HOSPADM

## 2019-11-13 RX ORDER — FENTANYL CITRATE 50 UG/ML
25 INJECTION, SOLUTION INTRAMUSCULAR; INTRAVENOUS EVERY 5 MIN PRN
Status: DISCONTINUED | OUTPATIENT
Start: 2019-11-13 | End: 2019-11-13 | Stop reason: HOSPADM

## 2019-11-13 RX ORDER — DEXAMETHASONE SODIUM PHOSPHATE 4 MG/ML
INJECTION, SOLUTION INTRA-ARTICULAR; INTRALESIONAL; INTRAMUSCULAR; INTRAVENOUS; SOFT TISSUE
Status: DISCONTINUED
Start: 2019-11-13 | End: 2019-11-13 | Stop reason: HOSPADM

## 2019-11-13 RX ORDER — ONDANSETRON 2 MG/ML
4 INJECTION INTRAMUSCULAR; INTRAVENOUS DAILY PRN
Status: DISCONTINUED | OUTPATIENT
Start: 2019-11-13 | End: 2019-11-13 | Stop reason: HOSPADM

## 2019-11-13 RX ORDER — INDOCYANINE GREEN AND WATER 25 MG
KIT INJECTION
Status: DISCONTINUED
Start: 2019-11-13 | End: 2019-11-13 | Stop reason: HOSPADM

## 2019-11-13 RX ADMIN — TROPICAMIDE 1 DROP: 10 SOLUTION/ DROPS OPHTHALMIC at 09:11

## 2019-11-13 RX ADMIN — SODIUM CHLORIDE: 9 INJECTION, SOLUTION INTRAVENOUS at 10:11

## 2019-11-13 RX ADMIN — MOXIFLOXACIN HYDROCHLORIDE 1 DROP: 5 SOLUTION/ DROPS OPHTHALMIC at 09:11

## 2019-11-13 RX ADMIN — PHENYLEPHRINE HYDROCHLORIDE 1 DROP: 25 SOLUTION/ DROPS OPHTHALMIC at 09:11

## 2019-11-13 RX ADMIN — CYCLOPENTOLATE HYDROCHLORIDE 1 DROP: 10 SOLUTION/ DROPS OPHTHALMIC at 09:11

## 2019-11-13 RX ADMIN — TROPICAMIDE 1 DROP: 10 SOLUTION/ DROPS OPHTHALMIC at 10:11

## 2019-11-13 RX ADMIN — PREDNISOLONE ACETATE 1 DROP: 10 SUSPENSION/ DROPS OPHTHALMIC at 09:11

## 2019-11-13 RX ADMIN — LIDOCAINE HYDROCHLORIDE 100 MG: 20 INJECTION, SOLUTION INTRAVENOUS at 10:11

## 2019-11-13 RX ADMIN — MOXIFLOXACIN 1 DROP: 5 SOLUTION/ DROPS OPHTHALMIC at 09:11

## 2019-11-13 RX ADMIN — TETRACAINE HYDROCHLORIDE 1 DROP: 5 SOLUTION OPHTHALMIC at 09:11

## 2019-11-13 RX ADMIN — PHENYLEPHRINE HYDROCHLORIDE 1 DROP: 2.5 SOLUTION/ DROPS OPHTHALMIC at 09:11

## 2019-11-13 RX ADMIN — PHENYLEPHRINE HYDROCHLORIDE 1 DROP: 25 SOLUTION/ DROPS OPHTHALMIC at 10:11

## 2019-11-13 RX ADMIN — PROPOFOL 50 MG: 10 INJECTION, EMULSION INTRAVENOUS at 10:11

## 2019-11-13 RX ADMIN — PREDNISOLONE ACETATE 1 DROP: 10 SUSPENSION OPHTHALMIC at 09:11

## 2019-11-13 NOTE — TRANSFER OF CARE
"Anesthesia Transfer of Care Note    Patient: Faith Valadez    Procedure(s) Performed: Procedure(s) (LRB):  VITRECTOMY, PARS PLANA APPROACH (Right)    Patient location: [facility]    Anesthesia Type: MAC    Transport from OR: Transported from OR on room air with adequate spontaneous ventilation    Post pain: adequate analgesia    Post assessment: no apparent anesthetic complications    Post vital signs: stable    Level of consciousness: awake, alert and oriented    Nausea/Vomiting: no nausea/vomiting    Complications: none    Transfer of care protocol was followed      Last vitals:   Visit Vitals  BP (!) 145/65 (BP Location: Left arm, Patient Position: Lying)   Pulse 63   Temp 36.7 °C (98 °F) (Temporal)   Resp 16   Ht 5' 2" (1.575 m)   Wt 88.9 kg (196 lb)   SpO2 99%   Breastfeeding? No   BMI 35.85 kg/m²     "

## 2019-11-13 NOTE — ANESTHESIA PREPROCEDURE EVALUATION
11/13/2019  Faith Valadez is a 74 y.o., female.    Anesthesia Evaluation    I have reviewed the Patient Summary Reports.    I have reviewed the Nursing Notes.   I have reviewed the Medications.     Review of Systems  Anesthesia Hx:  No problems with previous Anesthesia   Denies Personal Hx of Anesthesia complications.   Social:  Non-Smoker  Denies Tobacco Use.   Cardiovascular:   Hypertension, well controlled Denies MI.  Denies CAD.    Denies CABG/stent. Dysrhythmias  hyperlipidemia  Denies Coronary Artery Disease.  Hypertension , stage 1 hypertension, systolic 140 - 159 or diastolic 90 - 99  Disorder of Cardiac Rhythm, Paroxysmal Supraventricular Tachycardia (PSVT), hx of PSVT, controlled ventricular response, controlled on medical Rx    Pulmonary:   Denies COPD.  Denies Asthma.  Denies Asthma.  Denies Chronic Obstructive Pulmonary Disease (COPD).    Renal/:   Denies Chronic Renal Disease.   Denies Kidney Function/Disease    Hepatic/GI:   GERD Denies Liver Disease.  Esophageal / Stomach Disorders Gerd Controlled by chronic antireflux medication.  Denies Liver Disease    Neurological:   Denies TIA. Denies CVA. Denies Seizures.  Pain Etiology/Diagnosis, Fibromayalgia  Denies Seizure Disorder  Denies CVA - Cerebrovasular Accident  Denies TIA - Transient Ischemic Attack    Endocrine:   Diabetes, type 2 Denies Hypothyroidism.  Diabetes, Type 2 Diabetes , controlled by oral hypoglycemics.  Denies Thyroid Disease        Physical Exam  General:  Well nourished    Airway/Jaw/Neck:  Airway Findings: Mouth Opening: Normal Tongue: Normal  General Airway Assessment: Adult  Mallampati: III  Improves to II with phonation.  TM Distance: Normal, at least 6 cm      Dental:  Dental Findings: In tact   Chest/Lungs:  Chest/Lungs Findings: Clear to auscultation, Normal Respiratory Rate     Heart/Vascular:  Heart Findings:  Rate: Normal  Rhythm: Regular Rhythm  Sounds: Normal        Mental Status:  Mental Status Findings:  Cooperative, Alert and Oriented         Anesthesia Plan  Type of Anesthesia, risks & benefits discussed:  Anesthesia Type:  MAC  Patient's Preference:   Intra-op Monitoring Plan: standard ASA monitors  Intra-op Monitoring Plan Comments:   Post Op Pain Control Plan: per primary service following discharge from PACU and IV/PO Opioids PRN  Post Op Pain Control Plan Comments:   Induction:   IV  Beta Blocker:  Patient is on a Beta-Blocker and has received one dose within the past 24 hours (No further documentation required).       Informed Consent: Patient understands risks and agrees with Anesthesia plan.  Questions answered. Anesthesia consent signed with patient.  ASA Score: 2     Day of Surgery Review of History & Physical:            Ready For Surgery From Anesthesia Perspective.

## 2019-11-13 NOTE — BRIEF OP NOTE
Pre-Op Dx: ERM OD    Post Op Dx: same    Procedure Performed: 25g PPV/ILM peel/AFx OD    Attending Surgeon: Anmol    Assistant Surgeon:     Anesthesia: Local/Mac, retrobulbar injection of 4.0cc mixture 0.75%Marcaine, 2% Xylocaine    Estimated blood loss: Minimal    Complication: None    Specimen: None    Disposition: Stable to recovery    Findings/Outcome: significant ERM with foveal distortion    Date of Discharge: 11/13/19    Discharge Disposition: stable to recovery then home    F/U: tomorrow

## 2019-11-13 NOTE — PLAN OF CARE
Patient and son state they are ready to be discharged. Instructions, eye bag and prescriptions given to patient and family. Both verbalize understanding. Patient tolerating po liquids with no difficulty. Patient denies pain. Anesthesia consent and surgical consent in chart upon patient's discharge from [facility].

## 2019-11-13 NOTE — OP NOTE
DATE OF PROCEDURE:  11/13/2019    PREOPERATIVE DIAGNOSIS:  Epiretinal membrane to the right eye.    POSTOPERATIVE DIAGNOSIS:  Epiretinal membrane to the right eye.    PROCEDURE PERFORMED:  A 25-gauge pars plana vitrectomy with internal limiting   membrane peel, air-fluid exchange to the right eye.    ATTENDING SURGEON:  FORTINO Corea M.D.    ASSISTANT SURGEON:  Chris Streeter.    ANESTHESIA:  Local monitored anesthesia care with a retrobulbar injection of 4   mL mixture of 0.75% Marcaine and 2% Xylocaine.    ESTIMATED BLOOD LOSS:  Minimal.    COMPLICATIONS:  None.    DISPOSITION:  Stable to recovery.    INDICATIONS FOR SURGERY:  This is a 74-year-old female with a history of blurred   and distorted vision of her right eye over the last four to five years.  The   patient had had some progression and decreased vision, worsening from 20/50 to   20/200 recently with some increased distortion on her OCT scan.  Decision was   made to take the patient back to surgery to remove the membrane, prevent further   worsening of vision and hopefully improve some vision.  Risks, benefits, and   alternatives of surgery were discussed in detail with risks including loss of   vision, loss of eye, retinal detachment, infection, hemorrhage, cataract   formation, lens dislocation, glaucoma, hypotony, ptosis and diplopia.  The   patient voiced understanding and wished to proceed with the procedure.    DESCRIPTION OF PROCEDURE:  After proper informed consent was obtained, the   patient was brought back to the Operating Suite at [facility] where   MAC anesthesia was induced.  Retrobulbar injection was provided as above without   complication.  The patient was prepped and draped in normal sterile fashion for   ophthalmic surgery.  Lid speculum was placed in the right eye.  Standard 3-port   25-gauge pars plana vitrectomy was set up with the infusion cannula inserted 4   mm posterior to the limbus.  The infusion cannula was  turned on only after   observed to be free and clear of all underlying retinal tissue.  Supranasal and   supratemporal trocars were also placed 4 mm posterior to the limbus.  The   vitrector and light pipe were introduced in the vitreous cavity and a core   vitrectomy was performed.  Posterior hyaloid was partially , but it was   propagated out to the level of the vitreous base with the vitrector.  ICG was   used to stain the epiretinal membrane and internal limiting membrane complex,   which were both peeled off under direct contact lens visualization with the ILM   forceps.  Scleral depression was performed 360 degrees and no identifiable   retinal breaks were found.  An air-fluid exchange was performed.  The trocars   were removed from the eye, not leaking after gentle massage.  The eye was normal   pressure via palpation.  Subconjunctival injections of vancomycin and Decadron   were given to the patient.  The drapes were removed from the patient.  She was   washed free of Betadine prep solution.  Maxitrol ointment was placed in the   right eye.  The eye was patch shielded.  MAC anesthesia was reversed and she was   brought to Recovery Room in stable condition, tolerating the procedure well.    Dr. Corea was present for the entire case.      DAM/IN  dd: 11/13/2019 11:23:40 (CST)  td: 11/13/2019 12:09:19 (CST)  Doc ID   #[id number]  Job ID #[id number]    CC:

## 2019-11-13 NOTE — ANESTHESIA POSTPROCEDURE EVALUATION
Anesthesia Post Evaluation    Patient: Faith Valadez    Procedure(s) Performed: Procedure(s) (LRB):  VITRECTOMY, PARS PLANA APPROACH (Right)    Final Anesthesia Type: MAC  Patient location during evaluation: PACU  Patient participation: Yes- Able to Participate  Level of consciousness: awake and alert and oriented  Post-procedure vital signs: reviewed and stable  Pain management: adequate  Airway patency: patent  PONV status at discharge: No PONV  Anesthetic complications: no      Cardiovascular status: blood pressure returned to baseline and hemodynamically stable  Respiratory status: unassisted, spontaneous ventilation and room air  Hydration status: euvolemic  Follow-up not needed.          Vitals Value Taken Time   /66 11/13/2019 12:02 PM   Temp 36.8 °C (98.2 °F) 11/13/2019 11:15 AM   Pulse 57 11/13/2019 12:03 PM   Resp 18 11/13/2019 11:45 AM   SpO2 100 % 11/13/2019 12:03 PM   Vitals shown include unvalidated device data.      No case tracking events are documented in the log.      Pain/Lizzeth Score: Lizzeth Score: 10 (11/13/2019 11:15 AM)

## 2019-11-13 NOTE — INTERVAL H&P NOTE
Pt seen and examined at beside.  H&P reviewed, no changes.  All questions were answered.  Will proceed as planned.      Current Facility-Administered Medications:     cyclopentolate 1% ophthalmic solution 1 drop, 1 drop, Right Eye, On Call Procedure, Chris Streeter MD, 1 drop at 11/13/19 0950    lidocaine (PF) 10 mg/ml (1%) injection 10 mg, 1 mL, Intradermal, Once, Chris Streeter MD    moxifloxacin 0.5 % ophthalmic solution 1 drop, 1 drop, Right Eye, On Call Procedure, Chris Streeter MD, 1 drop at 11/13/19 0950    phenylephrine HCL 2.5% ophthalmic solution 1 drop, 1 drop, Right Eye, On Call Procedure, Chris Streeter MD, 1 drop at 11/13/19 1000    prednisoLONE acetate 1 % ophthalmic suspension 1 drop, 1 drop, Right Eye, On Call Procedure, Chris Streeter MD, 1 drop at 11/13/19 0950    sodium chloride 0.9% flush 10 mL, 10 mL, Intravenous, PRN, Chris Streeter MD    tetracaine HCl (PF) 0.5 % Drop 1 drop, 1 drop, Right Eye, On Call Procedure, Chris Streeter MD, 1 drop at 11/13/19 0940    tropicamide 1% ophthalmic solution 1 drop, 1 drop, Right Eye, On Call Procedure, Chris Streeter MD, 1 drop at 11/13/19 1000    Kierra Gonsalez MD  PGY2 Ophthalmology Resident  11/13/2019  10:05 AM

## 2019-11-13 NOTE — DISCHARGE INSTRUCTIONS
Post Op Instructions:  Patient should Maintain Eye shield & Dressing until seen tomorrow in eye clinic  Tylenol as needed for general discomfort  Use Prescription for pain medication if pain is severe  Use Prescription for Nausea (Zofran) if nausea or vomiting  No excessive exercise   No Bending, Lifting or Straining  Call MD if significant pain or nausea / vomiting uncontrolled by medications  Call MD if temperature in excess of 101' F  Return to eye clinic for Post Op Examination tomorrow Morning.  Bring Medicine bag to tomorrow's appointment.    PATIENT INSTRUCTIONS  POST-ANESTHESIA    IMMEDIATELY FOLLOWING SURGERY:  Do not drive or operate machinery for the first twenty four hours after surgery.  Do not make any important decisions for twenty four hours after surgery or while taking narcotic pain medications or sedatives.  If you develop intractable nausea and vomiting or a severe headache please notify your doctor immediately.    FOLLOW-UP:  Please make an appointment with your surgeon as instructed. You do not need to follow up with anesthesia unless specifically instructed to do so.    WOUND CARE INSTRUCTIONS (if applicable):  Keep a dry clean dressing on the anesthesia/puncture wound site if there is drainage.  Once the wound has quit draining you may leave it open to air.  Generally you should leave the bandage intact for twenty four hours unless there is drainage.  If the epidural site drains for more than 36-48 hours please call the anesthesia department.    QUESTIONS?:  Please feel free to call your physician or the hospital  if you have any questions, and they will be happy to assist you.       [facility] Anesthesia Department  [address]  [phone]

## 2019-11-14 ENCOUNTER — OFFICE VISIT (OUTPATIENT)
Dept: OPHTHALMOLOGY | Facility: CLINIC | Age: 74
End: 2019-11-14
Payer: MEDICARE

## 2019-11-14 DIAGNOSIS — H35.371 EPIRETINAL MEMBRANE, RIGHT EYE: Primary | ICD-10-CM

## 2019-11-14 PROCEDURE — 99999 PR PBB SHADOW E&M-EST. PATIENT-LVL IV: ICD-10-PCS | Mod: PBBFAC,,, | Performed by: OPHTHALMOLOGY

## 2019-11-14 PROCEDURE — 99024 POSTOP FOLLOW-UP VISIT: CPT | Mod: POP,,, | Performed by: OPHTHALMOLOGY

## 2019-11-14 PROCEDURE — 99214 OFFICE O/P EST MOD 30 MIN: CPT | Mod: PBBFAC | Performed by: OPHTHALMOLOGY

## 2019-11-14 PROCEDURE — 99024 PR POST-OP FOLLOW-UP VISIT: ICD-10-PCS | Mod: POP,,, | Performed by: OPHTHALMOLOGY

## 2019-11-14 PROCEDURE — 99999 PR PBB SHADOW E&M-EST. PATIENT-LVL IV: CPT | Mod: PBBFAC,,, | Performed by: OPHTHALMOLOGY

## 2019-11-14 RX ORDER — CYCLOPENTOLATE HYDROCHLORIDE 10 MG/ML
1 SOLUTION/ DROPS OPHTHALMIC ONCE
COMMUNITY
End: 2024-01-11

## 2019-11-14 RX ORDER — MOXIFLOXACIN 5 MG/ML
1 SOLUTION/ DROPS OPHTHALMIC 3 TIMES DAILY
COMMUNITY
End: 2023-05-01

## 2019-11-14 RX ORDER — PREDNISOLONE SODIUM PHOSPHATE 10 MG/ML
1 SOLUTION/ DROPS OPHTHALMIC
COMMUNITY
End: 2019-12-24 | Stop reason: ALTCHOICE

## 2019-11-14 NOTE — PROGRESS NOTES
HPI     Post-op Evaluation      Additional comments: 1 day post op      No problems overnight         Prior OCT - ERM OD with foveal distortion      A/P    1. ERM OD  Significant macular distortion with worsening Va from 20/50 to 20/200 over last year     Would benefit from PPV to get to 20/50 level again    S/p 25g PPV/ILM peel/AFx OD for ERM 11/13/19    Doing well, good IOP  Start gtts QID, ointment.shield QHS x 1 week    Then Taper PF 3/2/1/0    2. NS OU  Will need CE OD shortly after PPV    3. HTN Ret OU  BP control      2 weeks with Dr. Rey

## 2019-11-29 ENCOUNTER — OFFICE VISIT (OUTPATIENT)
Dept: OPHTHALMOLOGY | Facility: CLINIC | Age: 74
End: 2019-11-29
Payer: MEDICARE

## 2019-11-29 DIAGNOSIS — Z98.890 POST-OPERATIVE STATE: Primary | ICD-10-CM

## 2019-11-29 PROCEDURE — 99999 PR PBB SHADOW E&M-EST. PATIENT-LVL III: ICD-10-PCS | Mod: PBBFAC,,, | Performed by: OPHTHALMOLOGY

## 2019-11-29 PROCEDURE — 99024 PR POST-OP FOLLOW-UP VISIT: ICD-10-PCS | Mod: POP,,, | Performed by: OPHTHALMOLOGY

## 2019-11-29 PROCEDURE — 99024 POSTOP FOLLOW-UP VISIT: CPT | Mod: POP,,, | Performed by: OPHTHALMOLOGY

## 2019-11-29 PROCEDURE — 99213 OFFICE O/P EST LOW 20 MIN: CPT | Mod: PBBFAC | Performed by: OPHTHALMOLOGY

## 2019-11-29 PROCEDURE — 99999 PR PBB SHADOW E&M-EST. PATIENT-LVL III: CPT | Mod: PBBFAC,,, | Performed by: OPHTHALMOLOGY

## 2019-11-29 NOTE — PROGRESS NOTES
===============================  Faith Valadez,   74 y.o. female   Last visit [facility]: :2019   Last visit eye dept. 2019  VA:  Uncorrected distance visual acuity was 20/70 in the right eye and 20/50 in the left eye.  Tonometry     Tonometry (Applanation, 10:24 AM)       Right Left    Pressure 21                Not recorded        Manifest Refraction     Manifest Refraction       Sphere    Right plano    Left                Not recorded        Chief Complaint   Patient presents with    Post-op Evaluation     s/p erm sx od, pt states od is doing fine     Ophthalmic Medications     Ophthalmic - Anti-inflammatory, Glucocorticoids Start End     prednisoLONE sodium phosphate (INFLAMASE FORTE) 1 % Drop         Si drop.    Class: Historical Med           ________________  2019  HPI     Post-op Evaluation      Additional comments: s/p erm sx od, pt states od is doing fine              Comments     --Dm x since   --erm od (per Dr. Lo If ever has ERM peel would be best to have cat   surg & iol 1st.)  Saw Dr. Lo also  and patient decided against surgery  --Dry eyes  Punctal plugs (oasis) 14  restasis  --Ns ou  --pvd ou  --S. COAG based on ^c:d all diagnostics wnl (previously by Dr. Breaux)  cct 537/ 540   HVF 11/26/10  tmax 20 OU    S/p 25g ppv/ilm peel/afx od for erm / dr brito      Pred forte bid od          Last edited by BRAYDEN Kraft on 2019 10:12 AM. (History)      Problem List Items Addressed This Visit     None      Visit Diagnoses     Post-operative state    -  Primary          .continue Pred forte bid od  rtc  3 weeks       ===========================

## 2019-12-24 ENCOUNTER — OFFICE VISIT (OUTPATIENT)
Dept: OPHTHALMOLOGY | Facility: CLINIC | Age: 74
End: 2019-12-24
Payer: MEDICARE

## 2019-12-24 DIAGNOSIS — Z98.890 POST-OPERATIVE STATE: Primary | ICD-10-CM

## 2019-12-24 PROCEDURE — 99024 POSTOP FOLLOW-UP VISIT: CPT | Mod: POP,,, | Performed by: OPHTHALMOLOGY

## 2019-12-24 PROCEDURE — 99024 PR POST-OP FOLLOW-UP VISIT: ICD-10-PCS | Mod: POP,,, | Performed by: OPHTHALMOLOGY

## 2019-12-24 PROCEDURE — 99212 OFFICE O/P EST SF 10 MIN: CPT | Mod: PBBFAC | Performed by: OPHTHALMOLOGY

## 2019-12-24 PROCEDURE — 99999 PR PBB SHADOW E&M-EST. PATIENT-LVL II: CPT | Mod: PBBFAC,,, | Performed by: OPHTHALMOLOGY

## 2019-12-24 PROCEDURE — 99999 PR PBB SHADOW E&M-EST. PATIENT-LVL II: ICD-10-PCS | Mod: PBBFAC,,, | Performed by: OPHTHALMOLOGY

## 2019-12-24 NOTE — PROGRESS NOTES
===============================  Faith Valadez,   74 y.o. female   Last visit [facility]: :2019   Last visit eye dept. 2019  VA:  Corrected distance visual acuity was 20/100 in the right eye and 20/30 in the left eye.  Tonometry     Tonometry (Applanation, 10:23 AM)       Right Left    Pressure 16               Wearing Rx     Wearing Rx       Sphere Cylinder Axis Add    Right -3.50 +1.50 074 +2.75    Left -2.00 +1.25 095 +2.75    Type:  Bifocal              Manifest Refraction     Manifest Refraction (Retinoscopy)       Sphere Cylinder Dist VA    Right +0.25 Sphere 20/60    Left                 CYCLOPLEGIC     Cycloplegic Refraction       Sphere Cylinder Axis    Right -2.75 +2.00 090    Left -2.50 +1.25 095              Chief Complaint   Patient presents with    Post-op Evaluation     s/p erm ilm peel dr brito. pt states since her last visit she's noticed some improvement with her reading vision. not having as much distortion as before.      Ophthalmic Medications     Ophthalmic - Anti-inflammatory, Glucocorticoids Start End     prednisoLONE sodium phosphate (INFLAMASE FORTE) 1 % Drop (Discontinued)     2019    Si drop.    Class: Historical Med    Reason for Discontinue: Therapy completed           ________________  2019  HPI     Post-op Evaluation      Additional comments: s/p erm ilm peel dr brito. pt states since her   last visit she's noticed some improvement with her reading vision. not   having as much distortion as before.               Comments      --Dm x since   --erm od (per Dr. Lo If ever has ERM peel would be best to have cat   surg & iol 1st.)  Saw Dr. Lo also  and patient decided against surgery  --Dry eyes  Punctal plugs (oasis) 14  restasis  --Ns ou  --pvd ou  --S. COAG based on ^c:d all diagnostics wnl (previously by Dr. Breaux)  cct 537/ 540   HVF 11/26/10  tmax 20 OU    S/p 25g ppv/ilm peel/afx od for erm / dr brito                Last  edited by Enrique Watson on 12/24/2019 10:01 AM. (History)      Problem List Items Addressed This Visit     None      Visit Diagnoses     Post-operative state    -  Primary            S/p ppv erm section od  20/100 to 20/70  Subjectively much better  Oct better  NI with refraction  .rtc 2 months       ===========================

## 2020-02-28 ENCOUNTER — OFFICE VISIT (OUTPATIENT)
Dept: OPHTHALMOLOGY | Facility: CLINIC | Age: 75
End: 2020-02-28
Payer: MEDICARE

## 2020-02-28 DIAGNOSIS — H35.371 EPIRETINAL MEMBRANE, RIGHT: ICD-10-CM

## 2020-02-28 DIAGNOSIS — E11.9 TYPE 2 DIABETES MELLITUS WITHOUT COMPLICATION, WITHOUT LONG-TERM CURRENT USE OF INSULIN: Primary | ICD-10-CM

## 2020-02-28 PROCEDURE — 99999 PR PBB SHADOW E&M-EST. PATIENT-LVL II: ICD-10-PCS | Mod: PBBFAC,,, | Performed by: OPHTHALMOLOGY

## 2020-02-28 PROCEDURE — 99212 OFFICE O/P EST SF 10 MIN: CPT | Mod: PBBFAC | Performed by: OPHTHALMOLOGY

## 2020-02-28 PROCEDURE — 92014 COMPRE OPH EXAM EST PT 1/>: CPT | Mod: S$PBB,,, | Performed by: OPHTHALMOLOGY

## 2020-02-28 PROCEDURE — 92014 PR EYE EXAM, EST PATIENT,COMPREHESV: ICD-10-PCS | Mod: S$PBB,,, | Performed by: OPHTHALMOLOGY

## 2020-02-28 PROCEDURE — 99999 PR PBB SHADOW E&M-EST. PATIENT-LVL II: CPT | Mod: PBBFAC,,, | Performed by: OPHTHALMOLOGY

## 2020-02-28 RX ORDER — TRIAMCINOLONE ACETONIDE 1 MG/G
CREAM TOPICAL
COMMUNITY

## 2020-02-28 RX ORDER — PROMETHAZINE HYDROCHLORIDE AND DEXTROMETHORPHAN HYDROBROMIDE 6.25; 15 MG/5ML; MG/5ML
SYRUP ORAL
COMMUNITY
Start: 2019-12-31 | End: 2024-01-11

## 2020-02-28 RX ORDER — CYCLOSPORINE 0.5 MG/ML
1 EMULSION OPHTHALMIC 2 TIMES DAILY
Qty: 24 ML | Refills: 11 | Status: SHIPPED | OUTPATIENT
Start: 2020-02-28 | End: 2021-03-01

## 2020-02-28 NOTE — PROGRESS NOTES
===============================  Faith Valadez,  2/28/2020 today   74 y.o. female   Last visit [facility]: :12/24/2019   Last visit eye dept. 12/24/2019  VA:  Corrected distance visual acuity was 20/200 in the right eye and 20/25 in the left eye.  Tonometry     Tonometry (Applanation, 9:00 AM)       Right Left    Pressure 19 15              Wearing Rx     Wearing Rx       Sphere Cylinder Axis Add    Right -3.50 +1.25 080 +2.75    Left -2.00 +1.50 090 +2.75    Type:  Bifocal               Not recorded         Not recorded        Chief Complaint   Patient presents with    Epiretinal membrane     2 month check up       ________________  2/28/2020 today  HPI     Epiretinal membrane      Additional comments: 2 month check up              Comments     --Dm x since 2009  --erm od (per Dr. Lo If ever has ERM peel would be best to have cat   surg & iol 1st.)  Saw Dr. Lo also 2007 and patient decided against surgery  --Dry eyes  Punctal plugs (oasis) 6/16/14  restasis  --Ns ou  --pvd ou  --S. COAG based on ^c:d all diagnostics wnl (previously by Dr. Breaux)  cct 537/ 540   HVF 11/26/10  tmax 20 OU    S/p 25g ppv/ilm peel/afx od for erm / dr brito 11/13/19          Last edited by EVE Rey MD on 2/28/2020  9:32 AM. (History)      Problem List Items Addressed This Visit        Eye/Vision problems    Type 2 diabetes mellitus without complication - Primary      Other Visit Diagnoses     Epiretinal membrane, right              .doing great post ppv od erm peel  No bdr  rtc 1 year      ===========================

## 2020-08-03 ENCOUNTER — TELEPHONE (OUTPATIENT)
Dept: OPHTHALMOLOGY | Facility: CLINIC | Age: 75
End: 2020-08-03

## 2020-08-03 NOTE — TELEPHONE ENCOUNTER
Pt has had gradual change in vision OD over past few weeks.  Offered appt this Friday.  Pt declined, made appt on Monday 08/10.  Advised to call sooner if any drastic decline in vision.

## 2020-08-10 ENCOUNTER — OFFICE VISIT (OUTPATIENT)
Dept: OPHTHALMOLOGY | Facility: CLINIC | Age: 75
End: 2020-08-10
Payer: MEDICARE

## 2020-08-10 ENCOUNTER — TELEPHONE (OUTPATIENT)
Dept: OPHTHALMOLOGY | Facility: CLINIC | Age: 75
End: 2020-08-10

## 2020-08-10 DIAGNOSIS — E11.36 CONTROLLED TYPE 2 DIABETES MELLITUS WITH DIABETIC CATARACT, WITHOUT LONG-TERM CURRENT USE OF INSULIN: Primary | ICD-10-CM

## 2020-08-10 PROCEDURE — 99999 PR PBB SHADOW E&M-EST. PATIENT-LVL IV: ICD-10-PCS | Mod: PBBFAC,,, | Performed by: OPHTHALMOLOGY

## 2020-08-10 PROCEDURE — 99214 OFFICE O/P EST MOD 30 MIN: CPT | Mod: PBBFAC | Performed by: OPHTHALMOLOGY

## 2020-08-10 PROCEDURE — 99999 PR PBB SHADOW E&M-EST. PATIENT-LVL IV: CPT | Mod: PBBFAC,,, | Performed by: OPHTHALMOLOGY

## 2020-08-10 PROCEDURE — 92014 PR EYE EXAM, EST PATIENT,COMPREHESV: ICD-10-PCS | Mod: S$PBB,,, | Performed by: OPHTHALMOLOGY

## 2020-08-10 PROCEDURE — 92014 COMPRE OPH EXAM EST PT 1/>: CPT | Mod: S$PBB,,, | Performed by: OPHTHALMOLOGY

## 2020-08-10 NOTE — PROGRESS NOTES
===============================  Faith Valadez,  8/10/2020 today   74 y.o. female   Last visit [facility]: :2/28/2020   Last visit eye dept. 2/28/2020  VA:  Corrected distance visual acuity was 20/200 in the right eye and 20/25 in the left eye.  Tonometry     Tonometry (Applanation, 1:31 PM)       Right Left    Pressure 20 15              Wearing Rx     Wearing Rx       Sphere Cylinder Axis Add    Right -3.50 +1.25 080 +2.75    Left -2.00 +1.50 090 +2.75    Type: Bifocal              Manifest Refraction     Manifest Refraction       Sphere Cylinder Axis Dist VA Add    Right -3.50 +1.75 080 NI +2.75    Left -2.00 +1.50 090  +2.75               Not recorded        Chief Complaint   Patient presents with    Diabetes     pt states that her right eye starting cloud up on her, very dark vision       ________________  8/10/2020 today  HPI     Diabetes      Additional comments: pt states that her right eye starting cloud up on   her, very dark vision              Comments     --Dm x since 2009  --erm od (per Dr. Lo If ever has ERM peel would be best to have cat   surg & iol 1st.)  Saw Dr. Lo also 2007 and patient decided against surgery  --Dry eyes  Punctal plugs (oasis) 6/16/14  restasis  --Ns ou  --pvd ou  --S. COAG based on ^c:d all diagnostics wnl (previously by Dr. Breaux)  cct 537/ 540   HVF 11/26/10  tmax 20 OU    S/p 25g ppv/ilm peel/afx od for erm / dr brito 11/13/19          Last edited by EVE Rey MD on 8/10/2020  2:07 PM. (History)      Problem List Items Addressed This Visit     None      Visit Diagnoses     Controlled type 2 diabetes mellitus with diabetic cataract, without long-term current use of insulin    -  Primary          .no BDR  Post ERM peel OD 11/19  Now significant cataract OD  rec consult Dr. Bonilla for CE  Discussed guarded prognosis  But, va. Ok post surgery now worse.      ===========================

## 2020-08-10 NOTE — TELEPHONE ENCOUNTER
LM that I sked cat esvin w/[facility] on 8/12 @ 1:00.  Advised patient to call me if this date/time does not agree with her sked.

## 2020-08-12 ENCOUNTER — OFFICE VISIT (OUTPATIENT)
Dept: OPHTHALMOLOGY | Facility: CLINIC | Age: 75
End: 2020-08-12
Payer: MEDICARE

## 2020-08-12 DIAGNOSIS — H26.9 CORTICAL CATARACT OF RIGHT EYE: Primary | ICD-10-CM

## 2020-08-12 DIAGNOSIS — E11.9 TYPE 2 DIABETES MELLITUS WITHOUT COMPLICATION, WITHOUT LONG-TERM CURRENT USE OF INSULIN: ICD-10-CM

## 2020-08-12 DIAGNOSIS — H40.013 OPEN ANGLE WITH BORDERLINE FINDINGS, LOW RISK, BILATERAL: ICD-10-CM

## 2020-08-12 DIAGNOSIS — H25.13 NUCLEAR SCLEROSIS OF BOTH EYES: ICD-10-CM

## 2020-08-12 PROCEDURE — 99214 OFFICE O/P EST MOD 30 MIN: CPT | Mod: PBBFAC | Performed by: OPHTHALMOLOGY

## 2020-08-12 PROCEDURE — 99999 PR PBB SHADOW E&M-EST. PATIENT-LVL IV: ICD-10-PCS | Mod: PBBFAC,,, | Performed by: OPHTHALMOLOGY

## 2020-08-12 PROCEDURE — 92014 PR EYE EXAM, EST PATIENT,COMPREHESV: ICD-10-PCS | Mod: S$PBB,,, | Performed by: OPHTHALMOLOGY

## 2020-08-12 PROCEDURE — 92014 COMPRE OPH EXAM EST PT 1/>: CPT | Mod: S$PBB,,, | Performed by: OPHTHALMOLOGY

## 2020-08-12 PROCEDURE — 99999 PR PBB SHADOW E&M-EST. PATIENT-LVL IV: CPT | Mod: PBBFAC,,, | Performed by: OPHTHALMOLOGY

## 2020-08-12 PROCEDURE — 92136 OPHTHALMIC BIOMETRY: CPT | Mod: PBBFAC,RT | Performed by: OPHTHALMOLOGY

## 2020-08-12 PROCEDURE — 92136 IOL MASTER - OD - RIGHT EYE: ICD-10-PCS | Mod: 26,S$PBB,RT, | Performed by: OPHTHALMOLOGY

## 2020-08-12 RX ORDER — KETOROLAC TROMETHAMINE 5 MG/ML
1 SOLUTION OPHTHALMIC 4 TIMES DAILY
Qty: 1 BOTTLE | Refills: 2 | Status: SHIPPED | OUTPATIENT
Start: 2020-08-12 | End: 2021-03-01

## 2020-08-12 RX ORDER — PREDNISOLONE ACETATE 10 MG/ML
1 SUSPENSION/ DROPS OPHTHALMIC 4 TIMES DAILY
Qty: 1 BOTTLE | Refills: 2 | Status: SHIPPED | OUTPATIENT
Start: 2020-08-12 | End: 2021-03-01

## 2020-08-12 RX ORDER — MOXIFLOXACIN 5 MG/ML
1 SOLUTION/ DROPS OPHTHALMIC 3 TIMES DAILY
Qty: 5 ML | Refills: 2 | Status: SHIPPED | OUTPATIENT
Start: 2020-08-12 | End: 2021-03-01

## 2020-08-12 NOTE — PROGRESS NOTES
SUBJECTIVE  Faith Valadez is 74 y.o. female  Corrected distance visual acuity was 20/200 in the right eye and 20/25 +2 in the left eye.   Chief Complaint   Patient presents with    Cataract          HPI     Pt here for cat eval per [facility]. Pt states that her right eye is very   cloudy. She had retina surgery and was seeing very clearly, but says about   2 months ago, the cloudiness started. No c/o glare issues. No pain or   discomfort.     Referred by [facility]    1. NS OU    --------------------------------------------------    --Dm x since 2009  --erm od (per Dr. Lo If ever has ERM peel would be best to have cat   surg & iol 1st.)  Saw Dr. Lo also 2007 and patient decided against surgery  --Dry eyes  Punctal plugs (oasis) 6/16/14  restasis  --Ns ou  --pvd ou  --S. COAG based on ^c:d all diagnostics wnl (previously by Dr. Breaux)  cct 537/ 540   HVF 11/26/10  tmax 20 OU    S/p 25g ppv/ilm peel/afx od for erm / dr brito    Last edited by Stew Miranda, Patient Care Assistant on 8/12/2020  1:24   PM. (History)         Assessment /Plan :  1. Cortical cataract of right eye  Visually Significant Cataract OD:   Patient reports decreased vision consistent with the clinical amount of lenticular opacity,  which reaches the level of visual significance and affects activities of daily living such as  read. Risks, benefits, and alternatives to cataract surgery were  discussed.  IOL options were discussed, including Premium IOL'S and the associated  side effects and additional patient cost associated with them as well as patient's visual  goals. The pt expressed a desire to proceed with surgery with the potential for some  reasonable degree of visual improvement and was consented.  Risks of loss of vision and eye reviewed as well as possibility of need for spectacle correction after surgery even with premium implants. Verbal and written preop  instructions were provided to the patient.       Pt is interested in traditional  monofocal IOL aiming for:    Distance OU and understands that glasses will be generally needed at all times for near vision and often for finer distance correction especially for higher degrees of astigmatism.       Final visual acuity may be limited by astigmatism, retina and diabetes    Pt wishes to have Phaco/IOL  OD     Requests a Monofocal IOL.    Will aim for -1.00    Other considerations: None     2. Nuclear sclerosis of both eyes    3. Type 2 diabetes mellitus without complication, without long-term current use of insulin No diabetic retinopathy at this time. Reviewed diabetic eye precautions including avoiding tobacco use,  Good glucose control, and importance of regular follow up.      4. Open angle with borderline findings, low risk, bilateral No evidence of glaucoma at this time but based on risk factors recommend to continue monitoring.

## 2020-08-25 ENCOUNTER — TELEPHONE (OUTPATIENT)
Dept: OPHTHALMOLOGY | Facility: CLINIC | Age: 75
End: 2020-08-25

## 2020-08-25 NOTE — TELEPHONE ENCOUNTER
Eduard on both phones to advise that surgery is still going forward and that [facility] are watching the path of Shruthi.  As of today, Shruthi is expected to hit land late Wednesday evening, therefore, [facility] wants surgery patients to come back on Wednesday morning for post-op.  I left message that patient is to return to [facility] at 10:45 on 8/26.

## 2020-08-26 ENCOUNTER — OFFICE VISIT (OUTPATIENT)
Dept: OPHTHALMOLOGY | Facility: CLINIC | Age: 75
End: 2020-08-26
Payer: MEDICARE

## 2020-08-26 ENCOUNTER — OUTSIDE PLACE OF SERVICE (OUTPATIENT)
Dept: ADMINISTRATIVE | Facility: OTHER | Age: 75
End: 2020-08-26
Payer: MEDICARE

## 2020-08-26 DIAGNOSIS — Z98.890 POST-OPERATIVE STATE: Primary | ICD-10-CM

## 2020-08-26 PROCEDURE — 99999 PR PBB SHADOW E&M-EST. PATIENT-LVL II: CPT | Mod: PBBFAC,,, | Performed by: OPHTHALMOLOGY

## 2020-08-26 PROCEDURE — 99024 PR POST-OP FOLLOW-UP VISIT: ICD-10-PCS | Mod: POP,,, | Performed by: OPHTHALMOLOGY

## 2020-08-26 PROCEDURE — 66984 XCAPSL CTRC RMVL W/O ECP: CPT | Mod: RT,,, | Performed by: OPHTHALMOLOGY

## 2020-08-26 PROCEDURE — 99999 PR PBB SHADOW E&M-EST. PATIENT-LVL II: ICD-10-PCS | Mod: PBBFAC,,, | Performed by: OPHTHALMOLOGY

## 2020-08-26 PROCEDURE — 99212 OFFICE O/P EST SF 10 MIN: CPT | Mod: PBBFAC | Performed by: OPHTHALMOLOGY

## 2020-08-26 PROCEDURE — 99024 POSTOP FOLLOW-UP VISIT: CPT | Mod: POP,,, | Performed by: OPHTHALMOLOGY

## 2020-08-26 PROCEDURE — 66984 PR REMOVAL, CATARACT, W/INSRT INTRAOC LENS, W/O ENDO CYCLO: ICD-10-PCS | Mod: RT,,, | Performed by: OPHTHALMOLOGY

## 2020-08-26 NOTE — PROGRESS NOTES
SUBJECTIVE  Faith Valadez is 74 y.o. female  Uncorrected distance visual acuity was 20/400 in the right eye and not recorded in the left eye.   Chief Complaint   Patient presents with    Post-op Evaluation     Same Day PO PCIOL OD          HPI     Post-op Evaluation      Additional comments: Same Day PO PCIOL OD              Comments     Same Day PO PCIOL OD  No pain or discomfort OD, PAIN OS    Referred by [facility]    1. NS OS  PCIOL OD-    AT's prn OU  OD Vigamox TID, Ketorolac, Pred QID      --------------------------------------------------    --Dm x since 2009  --erm od (per Dr. Lo If ever has ERM peel would be best to have cat   surg & iol 1st.)  Saw Dr. Lo also 2007 and patient decided against surgery  --Dry eyes  Punctal plugs (oasis) 6/16/14  restasis  --Ns ou  --pvd ou  --S. COAG based on ^c:d all diagnostics wnl (previously by Dr. Breaux)  Phys. Cupping +Fhx father  cct 537/ 540   HVF 11/26/10  tmax 20 OU    S/p 25g ppv/ilm peel/afx od for erm / dr brito              Last edited by Suni Worley on 8/26/2020 11:17 AM. (History)         Assessment /Plan :  1. Post-operative state Exam:  SLE:  S/C: normal  K : trace k fold  AC: trace cell and flare  Iris: normal  Lens: PCIOL    IMP:  PO Same Day  S/P Phaco/IOL OD : Doing well.    Plan:  Continue gtts to operative eye:  Pred 1% QID  Ketorolac QID  Zymaxid BID  Reinstructed in importance of absolute compliance with Post-OP instructions including medications, shield at bedtime, and limitation of activities. Follow up appointments in approximately one and six weeks or call immediately for increased pain, redness or vision loss.

## 2020-09-03 ENCOUNTER — OFFICE VISIT (OUTPATIENT)
Dept: OPHTHALMOLOGY | Facility: CLINIC | Age: 75
End: 2020-09-03
Payer: MEDICARE

## 2020-09-03 DIAGNOSIS — Z98.890 POST-OPERATIVE STATE: Primary | ICD-10-CM

## 2020-09-03 DIAGNOSIS — H25.12 SENILE NUCLEAR CATARACT, LEFT: ICD-10-CM

## 2020-09-03 PROCEDURE — 99999 PR PBB SHADOW E&M-EST. PATIENT-LVL III: ICD-10-PCS | Mod: PBBFAC,,, | Performed by: OPHTHALMOLOGY

## 2020-09-03 PROCEDURE — 99213 OFFICE O/P EST LOW 20 MIN: CPT | Mod: PBBFAC | Performed by: OPHTHALMOLOGY

## 2020-09-03 PROCEDURE — 99024 POSTOP FOLLOW-UP VISIT: CPT | Mod: POP,,, | Performed by: OPHTHALMOLOGY

## 2020-09-03 PROCEDURE — 99024 PR POST-OP FOLLOW-UP VISIT: ICD-10-PCS | Mod: POP,,, | Performed by: OPHTHALMOLOGY

## 2020-09-03 PROCEDURE — 99999 PR PBB SHADOW E&M-EST. PATIENT-LVL III: CPT | Mod: PBBFAC,,, | Performed by: OPHTHALMOLOGY

## 2020-09-03 RX ORDER — MONTELUKAST SODIUM 10 MG/1
TABLET ORAL
COMMUNITY
Start: 2020-07-17

## 2020-09-03 NOTE — PROGRESS NOTES
SUBJECTIVE  Faith Valadez is 75 y.o. female  Uncorrected distance visual acuity was 20/80 -1 in the right eye and not recorded in the left eye.   Chief Complaint   Patient presents with    Post-op Evaluation          HPI     Pt here for 1 wk PCIOL OD PO. No pain or discomfort. VA stable. 100%   compliant with gtts.     Referred by [facility]    1. NS OS  PCIOL OD +19.5 SN60WF (-1.00) 8-  OD Vigamox TID, Ketorolac, Pred QID    --------------------------------------------------    --Dm x since 2009  --erm od (per Dr. Lo If ever has ERM peel would be best to have cat   surg & iol 1st.)  Saw Dr. Lo also 2007 and patient decided against surgery  --Dry eyes  Punctal plugs (oasis) 6/16/14  restasis  --Ns ou  --pvd ou  --S. COAG based on ^c:d all diagnostics wnl (previously by Dr. Breaux)  Phys. Cupping +Fhx father  cct 537/ 540   HVF 11/26/10  tmax 20 OU    S/p 25g ppv/ilm peel/afx od for erm / dr brito    AT's prn OU    Last edited by Stew Miranda, Patient Care Assistant on 9/3/2020  8:27   AM. (History)         Assessment /Plan :  1. Post-operative state Slit lamp exam:  L/L: nl  K: clear, wound sealed  AC: 0 cell and flare  Iris/Lens: IOL centered and stable      IMP:  PO Week 1 S/P Phaco/ IOL OD : Doing well with no evidence of infection or abnormal inflammation.     Plan:  Pt given and instructed in one week PO instructions. D/C zymaxid and start to taper off Ketorlac and Pred Forte 1% weekly. Can resume normal activitites and d/c eye shield. OTC reading glasses can be used until evaluated for final MR. Follow up in one month or PRN pain, redness, vision loss, or other concerns.     2. Senile nuclear cataract, left will like to wait

## 2020-10-02 ENCOUNTER — OFFICE VISIT (OUTPATIENT)
Dept: OPHTHALMOLOGY | Facility: CLINIC | Age: 75
End: 2020-10-02
Payer: MEDICARE

## 2020-10-02 DIAGNOSIS — H25.12 SENILE NUCLEAR CATARACT, LEFT: ICD-10-CM

## 2020-10-02 DIAGNOSIS — Z98.890 POST-OPERATIVE STATE: Primary | ICD-10-CM

## 2020-10-02 PROCEDURE — 99213 OFFICE O/P EST LOW 20 MIN: CPT | Mod: PBBFAC | Performed by: OPHTHALMOLOGY

## 2020-10-02 PROCEDURE — 99999 PR PBB SHADOW E&M-EST. PATIENT-LVL III: ICD-10-PCS | Mod: PBBFAC,,, | Performed by: OPHTHALMOLOGY

## 2020-10-02 PROCEDURE — 99024 PR POST-OP FOLLOW-UP VISIT: ICD-10-PCS | Mod: POP,,, | Performed by: OPHTHALMOLOGY

## 2020-10-02 PROCEDURE — 99999 PR PBB SHADOW E&M-EST. PATIENT-LVL III: CPT | Mod: PBBFAC,,, | Performed by: OPHTHALMOLOGY

## 2020-10-02 PROCEDURE — 99024 POSTOP FOLLOW-UP VISIT: CPT | Mod: POP,,, | Performed by: OPHTHALMOLOGY

## 2020-10-02 NOTE — PROGRESS NOTES
SUBJECTIVE  Faith Valadez is 75 y.o. female  Uncorrected distance visual acuity was 20/80 in the right eye and 20/40 in the left eye. Corrected distance visual acuity was 20/200 in the right eye and 20/30 in the left eye. Corrected near visual acuity was J3 in the right eye and J1 in the left eye.   Chief Complaint   Patient presents with    Post-op Evaluation     1 month PCIOL OD          HPI     Post-op Evaluation      Additional comments: 1 month PCIOL OD              Comments     Patient states little trouble with near vision but distance is fine and   sometimes sharp pain with the right eye.     Referred by [facility]    1. NS OS  PCIOL OD +19.5 SN60WF (-1.00) 8-            Last edited by Cassidy Carter on 10/2/2020 11:04 AM. (History)         Assessment /Plan :  1. Post-operative state   Exam:    Slit lamp exam:  L/L: nl  S/C: quiet and uninflamed  K: clear, wound sealed  AC: no cell or flare  Iris/Lens: IOL centered and stable      Assessment:    PO Month 1 S/P Phaco/IOL OD : Doing Well and completing healing process. Final visual correction options discussed and appropriate prescriptions and/or OTC reading glass recommendations offered to patient. Pt desires Trifocal Rx. Pt instructed to follow up with Dr. Rey as scheduled or PRN any pain, redness, vision changes or other concerns.     2. Senile nuclear cataract, left monitor for now

## 2021-01-12 ENCOUNTER — TELEPHONE (OUTPATIENT)
Dept: OPHTHALMOLOGY | Facility: CLINIC | Age: 76
End: 2021-01-12

## 2021-01-14 ENCOUNTER — OFFICE VISIT (OUTPATIENT)
Dept: OPHTHALMOLOGY | Facility: CLINIC | Age: 76
End: 2021-01-14
Payer: MEDICARE

## 2021-01-14 DIAGNOSIS — H04.123 DRY EYES, BILATERAL: Primary | ICD-10-CM

## 2021-01-14 PROCEDURE — 99214 OFFICE O/P EST MOD 30 MIN: CPT | Mod: PBBFAC | Performed by: OPTOMETRIST

## 2021-01-14 PROCEDURE — 92012 PR EYE EXAM, EST PATIENT,INTERMED: ICD-10-PCS | Mod: S$PBB,,, | Performed by: OPTOMETRIST

## 2021-01-14 PROCEDURE — 99999 PR PBB SHADOW E&M-EST. PATIENT-LVL IV: CPT | Mod: PBBFAC,,, | Performed by: OPTOMETRIST

## 2021-01-14 PROCEDURE — 99999 PR PBB SHADOW E&M-EST. PATIENT-LVL IV: ICD-10-PCS | Mod: PBBFAC,,, | Performed by: OPTOMETRIST

## 2021-01-14 PROCEDURE — 92012 INTRM OPH EXAM EST PATIENT: CPT | Mod: S$PBB,,, | Performed by: OPTOMETRIST

## 2021-03-01 ENCOUNTER — OFFICE VISIT (OUTPATIENT)
Dept: OPHTHALMOLOGY | Facility: CLINIC | Age: 76
End: 2021-03-01
Payer: MEDICARE

## 2021-03-01 DIAGNOSIS — H40.013 OPEN ANGLE WITH BORDERLINE FINDINGS, LOW RISK, BILATERAL: ICD-10-CM

## 2021-03-01 DIAGNOSIS — E11.9 TYPE 2 DIABETES MELLITUS WITHOUT COMPLICATION, WITHOUT LONG-TERM CURRENT USE OF INSULIN: Primary | ICD-10-CM

## 2021-03-01 DIAGNOSIS — H35.371 EPIRETINAL MEMBRANE, RIGHT: ICD-10-CM

## 2021-03-01 DIAGNOSIS — H04.123 DRY EYE SYNDROME, BILATERAL: ICD-10-CM

## 2021-03-01 DIAGNOSIS — E11.36 DIABETIC CATARACT: ICD-10-CM

## 2021-03-01 PROCEDURE — 92134 CPTRZ OPH DX IMG PST SGM RTA: CPT | Mod: PBBFAC | Performed by: OPHTHALMOLOGY

## 2021-03-01 PROCEDURE — 92134 POSTERIOR SEGMENT OCT RETINA (OCULAR COHERENCE TOMOGRAPHY)-BOTH EYES: ICD-10-PCS | Mod: 26,S$PBB,, | Performed by: OPHTHALMOLOGY

## 2021-03-01 PROCEDURE — 99999 PR PBB SHADOW E&M-EST. PATIENT-LVL IV: ICD-10-PCS | Mod: PBBFAC,,, | Performed by: OPHTHALMOLOGY

## 2021-03-01 PROCEDURE — 99213 PR OFFICE/OUTPT VISIT, EST, LEVL III, 20-29 MIN: ICD-10-PCS | Mod: S$PBB,,, | Performed by: OPHTHALMOLOGY

## 2021-03-01 PROCEDURE — 99214 OFFICE O/P EST MOD 30 MIN: CPT | Mod: PBBFAC | Performed by: OPHTHALMOLOGY

## 2021-03-01 PROCEDURE — 99999 PR PBB SHADOW E&M-EST. PATIENT-LVL IV: CPT | Mod: PBBFAC,,, | Performed by: OPHTHALMOLOGY

## 2021-03-01 PROCEDURE — 99213 OFFICE O/P EST LOW 20 MIN: CPT | Mod: S$PBB,,, | Performed by: OPHTHALMOLOGY

## 2021-03-01 RX ORDER — HYDROCORTISONE 25 MG/G
CREAM TOPICAL
COMMUNITY
Start: 2020-11-24

## 2021-03-01 RX ORDER — LIFITEGRAST 50 MG/ML
1 SOLUTION/ DROPS OPHTHALMIC 2 TIMES DAILY
Qty: 60 EACH | Refills: 11 | Status: SHIPPED | OUTPATIENT
Start: 2021-03-01 | End: 2023-06-07 | Stop reason: SDUPTHER

## 2022-04-21 NOTE — PROGRESS NOTES
===============================  Date today is 4/25/2022  Faith Valadez is a 76 y.o. female  Last visit [facility]: :3/1/2021   Last visit eye dept. Visit date not found    Corrected distance visual acuity was 20/200 in the right eye and 20/25 in the left eye.  Tonometry     Tonometry (Applanation, 3:13 PM)       Right Left    Pressure 14 12              Not recorded       Manifest Refraction     Manifest Refraction       Sphere Cylinder Axis Add    Right -2.00 +1.25 085 +2.75    Left -2.00 +1.50 090 +2.50              Not recorded       Chief Complaint   Patient presents with    Diabetic Eye Exam     Here for DM eye exam       Problem List Items Addressed This Visit        Eye/Vision problems    Type 2 diabetes mellitus without complication - Primary    Relevant Orders    Posterior Segment OCT Retina-Both eyes (Completed)      Other Visit Diagnoses     Epiretinal membrane, right        Relevant Orders    Posterior Segment OCT Retina-Both eyes (Completed)    Dry eye syndrome, bilateral        Open angle with borderline findings, low risk, bilateral        Diabetic cataract        Cataract extraction status of eye, right              ________________  4/25/2022 today    DM no retinopathy  OCT looks good  ERM OD  SCOAG  PCIOL OD  Trace NSC OS    RTC 1 year  Instructed to call 24/7 for any worsening of vision or symptoms. Check OU daily.   Gave my office and cell phone number.            ===============================

## 2022-04-25 ENCOUNTER — OFFICE VISIT (OUTPATIENT)
Dept: OPHTHALMOLOGY | Facility: CLINIC | Age: 77
End: 2022-04-25
Payer: MEDICARE

## 2022-04-25 DIAGNOSIS — H40.013 OPEN ANGLE WITH BORDERLINE FINDINGS, LOW RISK, BILATERAL: ICD-10-CM

## 2022-04-25 DIAGNOSIS — H04.123 DRY EYE SYNDROME, BILATERAL: ICD-10-CM

## 2022-04-25 DIAGNOSIS — E11.36 DIABETIC CATARACT: ICD-10-CM

## 2022-04-25 DIAGNOSIS — Z98.41 CATARACT EXTRACTION STATUS OF EYE, RIGHT: ICD-10-CM

## 2022-04-25 DIAGNOSIS — H35.371 EPIRETINAL MEMBRANE, RIGHT: ICD-10-CM

## 2022-04-25 DIAGNOSIS — E11.9 TYPE 2 DIABETES MELLITUS WITHOUT COMPLICATION, WITHOUT LONG-TERM CURRENT USE OF INSULIN: Primary | ICD-10-CM

## 2022-04-25 PROCEDURE — 99999 PR PBB SHADOW E&M-EST. PATIENT-LVL IV: ICD-10-PCS | Mod: PBBFAC,,, | Performed by: OPHTHALMOLOGY

## 2022-04-25 PROCEDURE — 99214 OFFICE O/P EST MOD 30 MIN: CPT | Mod: PBBFAC | Performed by: OPHTHALMOLOGY

## 2022-04-25 PROCEDURE — 99213 PR OFFICE/OUTPT VISIT, EST, LEVL III, 20-29 MIN: ICD-10-PCS | Mod: S$PBB,,, | Performed by: OPHTHALMOLOGY

## 2022-04-25 PROCEDURE — 92134 CPTRZ OPH DX IMG PST SGM RTA: CPT | Mod: PBBFAC | Performed by: OPHTHALMOLOGY

## 2022-04-25 PROCEDURE — 99999 PR PBB SHADOW E&M-EST. PATIENT-LVL IV: CPT | Mod: PBBFAC,,, | Performed by: OPHTHALMOLOGY

## 2022-04-25 PROCEDURE — 99213 OFFICE O/P EST LOW 20 MIN: CPT | Mod: S$PBB,,, | Performed by: OPHTHALMOLOGY

## 2022-04-25 PROCEDURE — 92134 POSTERIOR SEGMENT OCT RETINA (OCULAR COHERENCE TOMOGRAPHY)-BOTH EYES: ICD-10-PCS | Mod: 26,S$PBB,, | Performed by: OPHTHALMOLOGY

## 2022-10-18 DIAGNOSIS — M25.569 KNEE PAIN, UNSPECIFIED CHRONICITY, UNSPECIFIED LATERALITY: Primary | ICD-10-CM

## 2022-10-27 ENCOUNTER — OFFICE VISIT (OUTPATIENT)
Dept: ORTHOPEDICS | Facility: CLINIC | Age: 77
End: 2022-10-27
Payer: MEDICARE

## 2022-10-27 ENCOUNTER — HOSPITAL ENCOUNTER (OUTPATIENT)
Dept: RADIOLOGY | Facility: HOSPITAL | Age: 77
Discharge: HOME | End: 2022-10-27
Attending: ORTHOPAEDIC SURGERY
Payer: MEDICARE

## 2022-10-27 VITALS — HEIGHT: 62 IN | BODY MASS INDEX: 36.07 KG/M2 | WEIGHT: 196 LBS

## 2022-10-27 DIAGNOSIS — M25.569 KNEE PAIN, UNSPECIFIED CHRONICITY, UNSPECIFIED LATERALITY: ICD-10-CM

## 2022-10-27 DIAGNOSIS — M85.80 OSTEOPENIA, UNSPECIFIED LOCATION: ICD-10-CM

## 2022-10-27 DIAGNOSIS — M21.161 ACQUIRED VARUS DEFORMITY KNEE, RIGHT: ICD-10-CM

## 2022-10-27 DIAGNOSIS — M17.11 ARTHRITIS OF KNEE, RIGHT: Primary | ICD-10-CM

## 2022-10-27 DIAGNOSIS — I87.2 EDEMA OF LOWER EXTREMITY DUE TO PERIPHERAL VENOUS INSUFFICIENCY: ICD-10-CM

## 2022-10-27 DIAGNOSIS — M94.262 CHONDROMALACIA, LEFT KNEE: ICD-10-CM

## 2022-10-27 PROBLEM — E79.0 HYPERURICEMIA WITHOUT SIGNS OF INFLAMMATORY ARTHRITIS AND TOPHACEOUS DISEASE: Status: ACTIVE | Noted: 2022-10-27

## 2022-10-27 PROBLEM — K57.30 DIVERTICULAR DISEASE OF COLON: Status: ACTIVE | Noted: 2022-10-27

## 2022-10-27 PROBLEM — K22.70 BARRETT'S ESOPHAGUS: Status: ACTIVE | Noted: 2022-10-27

## 2022-10-27 PROCEDURE — 99999 PR PBB SHADOW E&M-EST. PATIENT-LVL V: ICD-10-PCS | Mod: PBBFAC,,, | Performed by: ORTHOPAEDIC SURGERY

## 2022-10-27 PROCEDURE — 73564 X-RAY EXAM KNEE 4 OR MORE: CPT | Mod: 26,LT,, | Performed by: RADIOLOGY

## 2022-10-27 PROCEDURE — 73564 PR  X-RAY KNEE 4+ VIEW: ICD-10-PCS | Mod: 26,LT,, | Performed by: RADIOLOGY

## 2022-10-27 PROCEDURE — 99999 PR PBB SHADOW E&M-EST. PATIENT-LVL V: CPT | Mod: PBBFAC,,, | Performed by: ORTHOPAEDIC SURGERY

## 2022-10-27 PROCEDURE — 99215 OFFICE O/P EST HI 40 MIN: CPT | Mod: PBBFAC | Performed by: ORTHOPAEDIC SURGERY

## 2022-10-27 PROCEDURE — 73564 X-RAY EXAM KNEE 4 OR MORE: CPT | Mod: TC,50

## 2022-10-27 PROCEDURE — 73564 X-RAY EXAM KNEE 4 OR MORE: CPT | Mod: 26,RT,, | Performed by: RADIOLOGY

## 2022-10-27 PROCEDURE — 99204 PR OFFICE/OUTPT VISIT, NEW, LEVL IV, 45-59 MIN: ICD-10-PCS | Mod: S$PBB,,, | Performed by: ORTHOPAEDIC SURGERY

## 2022-10-27 PROCEDURE — 99204 OFFICE O/P NEW MOD 45 MIN: CPT | Mod: S$PBB,,, | Performed by: ORTHOPAEDIC SURGERY

## 2022-10-27 RX ORDER — CELECOXIB 200 MG/1
200 CAPSULE ORAL DAILY
Qty: 30 CAPSULE | Refills: 2 | Status: SHIPPED | OUTPATIENT
Start: 2022-10-27

## 2022-10-27 NOTE — PATIENT INSTRUCTIONS
Days today showing right knee with moderate arthritis and loss of joint space on the inside and very small bone spur as far as the left knee is very minimal arthritis   You doing well since you had arthroscopic surgery in 2015 on the right knee it occasionally hurts   We will do Celebrex 200 mg when it hurts which is not every day only when you overdo it make sure you take it with food   You can also take Tylenol 650 mg not to exceed 3 times a day on top of the Celebrex if needed  Continue being very active  You legs are swollen today.  The maybe you should wear your compressive stockings at home and take her blood pressure medications   I will see you in around 4 months if you having severe pain he can not handle the right knee you might need an injection in it please call us will bring you in sooner

## 2022-10-27 NOTE — PROGRESS NOTES
Subjective:     Patient ID: Faith Valadez is a 77 y.o. female.    Chief Complaint: Pain of the Left Knee and Pain of the Right Knee    HPI:  10/27/2022   Bilateral knee pain right worse than left pain level is around 2/10.  Gives history of right knee arthroscopy I performed years ago.  Also I performed right shoulder rotator cuff surgery.  She is doing really well at this time she does take Celebrex on occasion but not on a daily basis.  She wanted to establish care since she knows were I am right now.  The left knee is not hurting her as much either.  She does ambulate without any assistive devices.  She still working preparing records etcetera.  The arthroscopic surgery performed in 2014 or 15.  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  She does not do it on a daily basis once in a while.  Occasionally she does take Tylenol.  She tries to keep on moving and being very active.    Denies any fever or chills or shortness of breath or difficulty with chewing or swallowing loss of bowel bladder control blurry vision double vision loss sense smell or taste  She does have occasional back pain but able to manage    Past Medical History:   Diagnosis Date    Arthritis     Cataract     Diabetes mellitus     Essential hypertension 9/4/2020 1:00:21 PM    [facility] - Cardiovascular: Hypertension-No Additional Notes    Essential hypertension 9/4/2020 1:00:21 PM    [facility] - Cardiovascular: Hypertension-No Additional Notes    Glaucoma suspect, both eyes     History of other drug therapy 9/10/2021 2:37:41 PM    [facility] Historical - Unknown: COVID-19 vaccine series completed-No Additional Notes    History of other drug therapy 9/10/2021 2:37:41 PM    [facility] Historical - Unknown: COVID-19 vaccine series completed-No Additional Notes    Hypertension     Open angle with borderline findings, low risk 5/25/2015    Other abnormal glucose 9/4/2020  12:50:16 PM    [facility] - Unknown: Prediabetes-No Additional Notes    Other abnormal glucose 9/4/2020 12:50:16 PM    [facility] - Unknown: Prediabetes-No Additional Notes     Past Surgical History:   Procedure Laterality Date    Arthroscopic surgery right knee Right     CATARACT EXTRACTION W/  INTRAOCULAR LENS IMPLANT Right 08/26/2020    Rotator cuff repair right shoulder Right     VITRECTOMY BY PARS PLANA APPROACH Right 11/13/2019    Procedure: VITRECTOMY, PARS PLANA APPROACH;  Surgeon: FORTINO Corea MD;  Location: [address];  Service: Ophthalmology;  Laterality: Right;  40 min     Family History   Problem Relation Age of Onset    Glaucoma Father     Cataracts Father     Cancer Father     Diabetes Sister     Cataracts Sister     Cancer Sister     Hypertension Sister     Thyroid disease Brother     Thyroid disease Maternal Aunt     Hypertension Maternal Uncle     Stroke Maternal Grandmother     Diabetes Maternal Grandfather     Stroke Maternal Grandfather     Thyroid disease Paternal Grandmother     Glaucoma Son     Amblyopia Neg Hx     Blindness Neg Hx     Strabismus Neg Hx     Retinal detachment Neg Hx     Macular degeneration Neg Hx      Social History     Socioeconomic History    Marital status: Single   Tobacco Use    Smoking status: Never    Smokeless tobacco: Never   Substance and Sexual Activity    Alcohol use: No   Social History Narrative    ** Merged History Encounter **          Medication List with Changes/Refills   New Medications    CELECOXIB (CELEBREX) 200 MG CAPSULE    Take 1 capsule (200 mg total) by mouth once daily. Take with food   Current Medications    ASPIRIN 81 MG CHEW    Take 81 mg by mouth once daily.     AZELASTINE-FLUTICASONE (DYMISTA) 137-50 MCG/SPRAY SPRY NASSAL SPRAY    2 sprays by Each Nostril route 2 (two) times daily as needed.     CELECOXIB (CELEBREX) 200 MG CAPSULE    Take 200 mg by mouth 2 (two) times daily.    CHOLECALCIFEROL, VITAMIN  D3, 125 MCG (5,000 UNIT) TAB    Take 5,000 Units by mouth once daily.     CLONAZEPAM (KLONOPIN) 0.5 MG TABLET    Take 0.5 mg by mouth 2 (two) times daily as needed.    CYCLOBENZAPRINE (FLEXERIL) 10 MG TABLET    Take 10 mg by mouth 3 (three) times daily as needed.     CYCLOPENTOLATE 1% (CYCLOGYL) 1 % OPHTHALMIC SOLUTION    1 drop once.    DICYCLOMINE (BENTYL) 10 MG CAPSULE    Take 10 mg by mouth 3 (three) times daily.     DIGOXIN (LANOXIN) 250 MCG TABLET    Take 250 mcg by mouth once daily.     DYMISTA 137-50 MCG/SPRAY SPRY        FUROSEMIDE (LASIX) 40 MG TABLET    Take 40 mg by mouth 2 (two) times daily.     GLIPIZIDE (GLUCOTROL) 2.5 MG TR24    Take 2.5 mg by mouth daily with breakfast.     HYDROCORTISONE 2.5 % CREAM    APPLY SMALL AMOUNT OF CREAM TO AFFECTED AREA TWICE DAILY FOR 2 WEEKS THEN AS NEEDED    HYDROCORTISONE-IODOQUINOL-ALOE 1.9-1 % CRPK        HYDROXYZINE (ATARAX) 25 MG TABLET    Take 25 mg by mouth 3 (three) times daily as needed.     LIDOCAINE (LIDODERM) 5 %        LIDOCAINE-PRILOCAINE (EMLA) CREAM        LIFITEGRAST (XIIDRA) 5 % DPET    Place 1 drop into both eyes 2 (two) times daily.    METOPROLOL SUCCINATE (TOPROL-XL) 100 MG 24 HR TABLET    Take 100 mg by mouth 2 (two) times daily.    MILNACIPRAN (SAVELLA) 25 MG TAB TABLET    Take 25 mg by mouth 2 (two) times daily.    MONTELUKAST (SINGULAIR) 10 MG TABLET    TAKE 1 TABLET BY MOUTH ONCE DAILY FOR 30 DAYS    MOXIFLOXACIN (VIGAMOX) 0.5 % OPHTHALMIC SOLUTION    1 drop 3 (three) times daily.    MULTIVITAMIN-IRON-FOLIC ACID TAB        NYSTATIN (MYCOSTATIN) CREAM    1 application to affected area    ONDANSETRON (ZOFRAN) 4 MG TABLET    Take 1 tablet (4 mg total) by mouth every 8 (eight) hours as needed for Nausea.    ONE TOUCH ULTRA TEST STRP        ONE TOUCH ULTRAMINI KIT        ONETOUCH DELICA LANCETS 33 GAUGE MISC        OXYBUTYNIN (DITROPAN-XL) 5 MG TR24    Take 5 mg by mouth once daily.    PANTOPRAZOLE (PROTONIX) 40 MG TABLET    Take 40 mg by mouth 2  "(two) times daily.     POTASSIUM CHLORIDE (KLOR-CON) 20 MEQ PACK    Take 20 mEq by mouth once daily.     PROMETHAZINE-DEXTROMETHORPHAN (PROMETHAZINE-DM) 6.25-15 MG/5 ML SYRP    TK 5 ML PO Q 4 TO 6 H FOR 7 DAYS PRN    QROXIN 0.0375-5 % PTMD        ROSUVASTATIN (CRESTOR) 40 MG TAB    Take 40 mg by mouth every evening.     TIZANIDINE 4 MG CAP    Take 4 mg by mouth 3 (three) times daily as needed.     TOBRAMYCIN-DEXAMETHASONE 0.3-0.1% (TOBRADEX) 0.3-0.1 % OINT    3 (three) times daily.    TRIAMCINOLONE ACETONIDE 0.1% (KENALOG) 0.1 % CREAM        VITAMIN B COMPLEX (SUPER B COMPLEX-B-12 ORAL)    Take by mouth once daily.     ZOSTAVAX, PF, 19,400 UNIT/0.65 ML INJECTION         Review of patient's allergies indicates:   Allergen Reactions    Codeine Shortness Of Breath, Nausea Only and Rash     Reports throat "tightening".    Gabapentin Hallucinations    Amoxicillin Hives    Latex, natural rubber Itching and Rash    Penicillins Hives    Ciprofloxacin Itching    Lisinopril Other (See Comments)    Nitrofurantoin monohyd/m-cryst Itching    Pregabalin Other (See Comments)    Sulfa (sulfonamide antibiotics) Rash    Valacyclovir Rash     Review of Systems   Constitutional: Negative for decreased appetite.   HENT:  Negative for tinnitus.    Eyes:  Negative for double vision.   Cardiovascular:  Negative for chest pain.   Respiratory:  Negative for wheezing.    Hematologic/Lymphatic: Negative for bleeding problem.   Skin:  Negative for dry skin.   Musculoskeletal:  Positive for arthritis, back pain and joint pain. Negative for gout, neck pain and stiffness.   Gastrointestinal:  Negative for abdominal pain.   Genitourinary:  Negative for bladder incontinence.   Neurological:  Negative for numbness, paresthesias and sensory change.   Psychiatric/Behavioral:  Negative for altered mental status.      Objective:   Body mass index is 35.85 kg/m².  There were no vitals filed for this visit.       General    Constitutional: She is oriented " to person, place, and time. She appears well-developed.   HENT:   Head: Atraumatic.   Eyes: EOM are normal.   Pulmonary/Chest: Effort normal.   Neurological: She is alert and oriented to person, place, and time.   Psychiatric: Judgment normal.           Ambulating without any assistive devices   Pelvis is level  Bilateral hips passive motion without pain in the groin  Hip flexors, abductors adductors quads and hamstrings are slightly weak at 5-/5   Right knee with very mild medial joint tenderness and there is crepitus to compression on the patella with range of motion.  Active motion 0-125 degrees.  Collaterals and cruciates are stable.  Very mild swelling.  No defect in the patella or quadriceps tendon   The left knee with very mild medial joint tenderness.  Mild crepitus to the patella.  No swelling.  Active motion 0-130 degrees.  Collaterals and cruciates stable.  No defect in the patella or quadriceps tendon   Calves are soft nontender   Pitting edema up to mid tibia   Ankle motion is intact   Skin is warm to touch no obvious lesions  Positive capillary refill less than 2 seconds  Relevant imaging results reviewed and interpreted by me, discussed with the patient and / or family today     X-ray 10/27/2022 bilateral knees right knee with moderate loss of medial joint space.  There was some small marginal osteophytes.  Osteopenic bone no evidence of fracture.  The left knee is mild medial joint narrowing and osteopenic bone  Assessment:     Encounter Diagnoses   Name Primary?    Osteopenia, unspecified location     Arthritis of knee, right Yes    Acquired varus deformity knee, right     Chondromalacia, left knee     Edema of lower extremity due to peripheral venous insufficiency         Plan:   Arthritis of knee, right    Osteopenia, unspecified location    Acquired varus deformity knee, right    Chondromalacia, left knee    Edema of lower extremity due to peripheral venous insufficiency    Other orders  -      celecoxib (CELEBREX) 200 MG capsule; Take 1 capsule (200 mg total) by mouth once daily. Take with food  Dispense: 30 capsule; Refill: 2       Patient Instructions   Days today showing right knee with moderate arthritis and loss of joint space on the inside and very small bone spur as far as the left knee is very minimal arthritis   You doing well since you had arthroscopic surgery in 2015 on the right knee it occasionally hurts   We will do Celebrex 200 mg when it hurts which is not every day only when you overdo it make sure you take it with food   You can also take Tylenol 650 mg not to exceed 3 times a day on top of the Celebrex if needed  Continue being very active  You legs are swollen today.  The maybe you should wear your compressive stockings at home and take her blood pressure medications   I will see you in around 4 months if you having severe pain he can not handle the right knee you might need an injection in it please call us will bring you in sooner        Disclaimer: This note was prepared using a voice recognition system and is likely to have sound alike errors within the text.

## 2023-02-27 ENCOUNTER — OFFICE VISIT (OUTPATIENT)
Dept: ORTHOPEDICS | Facility: CLINIC | Age: 78
End: 2023-02-27
Payer: MEDICARE

## 2023-02-27 VITALS — BODY MASS INDEX: 36.52 KG/M2 | WEIGHT: 198.44 LBS | HEIGHT: 62 IN

## 2023-02-27 DIAGNOSIS — I87.2 EDEMA OF LOWER EXTREMITY DUE TO PERIPHERAL VENOUS INSUFFICIENCY: ICD-10-CM

## 2023-02-27 DIAGNOSIS — M21.161 ACQUIRED VARUS DEFORMITY KNEE, RIGHT: ICD-10-CM

## 2023-02-27 DIAGNOSIS — M17.11 PRIMARY OSTEOARTHRITIS OF RIGHT KNEE: Primary | ICD-10-CM

## 2023-02-27 DIAGNOSIS — M94.262 CHONDROMALACIA, LEFT KNEE: ICD-10-CM

## 2023-02-27 DIAGNOSIS — M17.11 ARTHRITIS OF KNEE, RIGHT: Primary | ICD-10-CM

## 2023-02-27 PROCEDURE — 99215 OFFICE O/P EST HI 40 MIN: CPT | Mod: PBBFAC | Performed by: ORTHOPAEDIC SURGERY

## 2023-02-27 PROCEDURE — 20610 DRAIN/INJ JOINT/BURSA W/O US: CPT | Mod: PBBFAC | Performed by: ORTHOPAEDIC SURGERY

## 2023-02-27 PROCEDURE — 99999 PR PBB SHADOW E&M-EST. PATIENT-LVL V: CPT | Mod: PBBFAC,,, | Performed by: ORTHOPAEDIC SURGERY

## 2023-02-27 PROCEDURE — 20610 LARGE JOINT ASPIRATION/INJECTION: R KNEE: ICD-10-PCS | Mod: S$PBB,RT,, | Performed by: ORTHOPAEDIC SURGERY

## 2023-02-27 PROCEDURE — 99213 PR OFFICE/OUTPT VISIT, EST, LEVL III, 20-29 MIN: ICD-10-PCS | Mod: 25,S$PBB,, | Performed by: ORTHOPAEDIC SURGERY

## 2023-02-27 PROCEDURE — 99999 PR PBB SHADOW E&M-EST. PATIENT-LVL V: ICD-10-PCS | Mod: PBBFAC,,, | Performed by: ORTHOPAEDIC SURGERY

## 2023-02-27 PROCEDURE — 99213 OFFICE O/P EST LOW 20 MIN: CPT | Mod: 25,S$PBB,, | Performed by: ORTHOPAEDIC SURGERY

## 2023-02-27 RX ORDER — CARVEDILOL 25 MG/1
25 TABLET ORAL 2 TIMES DAILY
COMMUNITY
Start: 2023-01-03

## 2023-02-27 RX ORDER — METHYLPREDNISOLONE ACETATE 80 MG/ML
80 INJECTION, SUSPENSION INTRA-ARTICULAR; INTRALESIONAL; INTRAMUSCULAR; SOFT TISSUE
Status: DISCONTINUED | OUTPATIENT
Start: 2023-02-27 | End: 2023-02-27 | Stop reason: HOSPADM

## 2023-02-27 RX ADMIN — METHYLPREDNISOLONE ACETATE 80 MG: 80 INJECTION, SUSPENSION INTRALESIONAL; INTRAMUSCULAR; INTRASYNOVIAL; SOFT TISSUE at 03:02

## 2023-02-27 NOTE — PROCEDURES
Large Joint Aspiration/Injection: R knee    Date/Time: 2/27/2023 3:00 PM  Performed by: Francesco Thompson MD  Authorized by: Francesco Thompson MD     Consent Done?:  Yes (Verbal)  Indications:  Arthritis  Site marked: the procedure site was marked    Timeout: prior to procedure the correct patient, procedure, and site was verified      Local anesthesia used?: Yes    Local anesthetic:  Lidocaine 1% without epinephrine    Details:  Needle Size:  22 G  Ultrasonic Guidance for needle placement?: No    Approach:  Anterolateral  Location:  Knee  Site:  R knee  Medications:  80 mg methylPREDNISolone acetate 80 mg/mL  Patient tolerance:  Patient tolerated the procedure well with no immediate complications

## 2023-02-27 NOTE — PATIENT INSTRUCTIONS
Your right knee seems to be the 1 that is bothering her the most   You trying to avoid taking Celebrex and too many pills because you have a bad esophagus and I do not want you to take it on a daily basis because in the long run the all affect the kidneys and the liver and could cause ulcers despite the are safe a little bit  You can still use topicals like Voltaren cream or diclofenac gel and only 4% of that gets absorbed in the blood   Will inject the right knee with 80 mg Depo-Medrol/methylprednisolone mixed with 5 cc of 1% lidocaine 02/27/2023   Ice the knee next few days in might hurt a little bit more or swell up but it will go away   As far as the left knee is not as bad as the right side  Will see you back in 3 months will get your insurance to approve you for the gel injection for the right knee

## 2023-02-28 NOTE — PROGRESS NOTES
Subjective:     Patient ID: Faith Valadez is a 77 y.o. female.    Chief Complaint: Pain of the Right Knee and Pain of the Left Knee      HPI:  10/27/2022   Bilateral knee pain right worse than left pain level is around 2/10.  Gives history of right knee arthroscopy I performed years ago.  Also I performed right shoulder rotator cuff surgery.  She is doing really well at this time she does take Celebrex on occasion but not on a daily basis.  She wanted to establish care since she knows were I am right now.  The left knee is not hurting her as much either.  She does ambulate without any assistive devices.  She still working preparing records etcetera.  The arthroscopic surgery performed in 2014 or 15.  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  She does not do it on a daily basis once in a while.  Occasionally she does take Tylenol.  She tries to keep on moving and being very active.    Denies any fever or chills or shortness of breath or difficulty with chewing or swallowing loss of bowel bladder control blurry vision double vision loss sense smell or taste  She does have occasional back pain but able to manage      02/27/2022   Bilateral knee pain with the right greater than the left.  Her pain is 4/10.  She does take Celebrex on occasions but not on a daily basis.  She tries to remain very active and now she has grandchildren that she tries to get active with them.  She tries not to take Celebrex and too many medications which she knows the affect her somehow or the other.  We went over her medications and other treatment plans that could be given to her knees.  She had previous arthroscopic surgeries done years ago by me.  We went over the x-rays with her again today showed her the arthritic changes in the knee joint.  We did discuss briefly treatment of different modalities including steroid injections and viscosupplementation   No fever no chills no shortness of breath  or difficulty with chewing swallowing loss of bowel bladder control blurry vision double vision loss sense smell or taste  Past Medical History:   Diagnosis Date    Arthritis     Cataract     Diabetes mellitus     Essential hypertension 9/4/2020 1:00:21 PM    [facility] Historical - Cardiovascular: Hypertension-No Additional Notes    Essential hypertension 9/4/2020 1:00:21 PM    [facility] Historical - Cardiovascular: Hypertension-No Additional Notes    Glaucoma suspect, both eyes     History of other drug therapy 9/10/2021 2:37:41 PM    [facility] Historical - Unknown: COVID-19 vaccine series completed-No Additional Notes    History of other drug therapy 9/10/2021 2:37:41 PM    [facility] Historical - Unknown: COVID-19 vaccine series completed-No Additional Notes    Hypertension     Open angle with borderline findings, low risk 5/25/2015    Other abnormal glucose 9/4/2020 12:50:16 PM    [facility] Historical - Unknown: Prediabetes-No Additional Notes    Other abnormal glucose 9/4/2020 12:50:16 PM    [facility] Historical - Unknown: Prediabetes-No Additional Notes     Past Surgical History:   Procedure Laterality Date    Arthroscopic surgery right knee Right     CATARACT EXTRACTION W/  INTRAOCULAR LENS IMPLANT Right 08/26/2020    Rotator cuff repair right shoulder Right     VITRECTOMY BY PARS PLANA APPROACH Right 11/13/2019    Procedure: VITRECTOMY, PARS PLANA APPROACH;  Surgeon: FORTINO Corea MD;  Location: [address];  Service: Ophthalmology;  Laterality: Right;  40 min     Family History   Problem Relation Age of Onset    Glaucoma Father     Cataracts Father     Cancer Father     Diabetes Sister     Cataracts Sister     Cancer Sister     Hypertension Sister     Thyroid disease Brother     Thyroid disease Maternal Aunt     Hypertension Maternal Uncle     Stroke Maternal Grandmother     Diabetes Maternal Grandfather     Stroke Maternal Grandfather     Thyroid disease Paternal Grandmother      Glaucoma Son     Amblyopia Neg Hx     Blindness Neg Hx     Strabismus Neg Hx     Retinal detachment Neg Hx     Macular degeneration Neg Hx      Social History     Socioeconomic History    Marital status: Single   Tobacco Use    Smoking status: Never    Smokeless tobacco: Never   Substance and Sexual Activity    Alcohol use: No   Social History Narrative    ** Merged History Encounter **          Medication List with Changes/Refills   Current Medications    ASPIRIN 81 MG CHEW    Take 81 mg by mouth once daily.     AZELASTINE-FLUTICASONE (DYMISTA) 137-50 MCG/SPRAY SPRY NASSAL SPRAY    2 sprays by Each Nostril route 2 (two) times daily as needed.     CARVEDILOL (COREG) 25 MG TABLET    Take 25 mg by mouth 2 (two) times daily.    CELECOXIB (CELEBREX) 200 MG CAPSULE    Take 200 mg by mouth 2 (two) times daily.    CELECOXIB (CELEBREX) 200 MG CAPSULE    Take 1 capsule (200 mg total) by mouth once daily. Take with food    CHOLECALCIFEROL, VITAMIN D3, 125 MCG (5,000 UNIT) TAB    Take 5,000 Units by mouth once daily.     CLONAZEPAM (KLONOPIN) 0.5 MG TABLET    Take 0.5 mg by mouth 2 (two) times daily as needed.    CYCLOBENZAPRINE (FLEXERIL) 10 MG TABLET    Take 10 mg by mouth 3 (three) times daily as needed.     CYCLOPENTOLATE 1% (CYCLOGYL) 1 % OPHTHALMIC SOLUTION    1 drop once.    DICYCLOMINE (BENTYL) 10 MG CAPSULE    Take 10 mg by mouth 3 (three) times daily.     DIGOXIN (LANOXIN) 250 MCG TABLET    Take 250 mcg by mouth once daily.     DYMISTA 137-50 MCG/SPRAY SPRY        FUROSEMIDE (LASIX) 40 MG TABLET    Take 40 mg by mouth 2 (two) times daily.     GLIPIZIDE (GLUCOTROL) 2.5 MG TR24    Take 2.5 mg by mouth daily with breakfast.     HYDROCORTISONE 2.5 % CREAM    APPLY SMALL AMOUNT OF CREAM TO AFFECTED AREA TWICE DAILY FOR 2 WEEKS THEN AS NEEDED    HYDROCORTISONE-IODOQUINOL-ALOE 1.9-1 % CRPK        HYDROXYZINE (ATARAX) 25 MG TABLET    Take 25 mg by mouth 3 (three) times daily as needed.     LIDOCAINE (LIDODERM) 5 %         "LIDOCAINE-PRILOCAINE (EMLA) CREAM        LIFITEGRAST (XIIDRA) 5 % DPET    Place 1 drop into both eyes 2 (two) times daily.    MILNACIPRAN (SAVELLA) 25 MG TAB TABLET    Take 25 mg by mouth 2 (two) times daily.    MONTELUKAST (SINGULAIR) 10 MG TABLET    TAKE 1 TABLET BY MOUTH ONCE DAILY FOR 30 DAYS    MOXIFLOXACIN (VIGAMOX) 0.5 % OPHTHALMIC SOLUTION    1 drop 3 (three) times daily.    MULTIVITAMIN-IRON-FOLIC ACID TAB        NYSTATIN (MYCOSTATIN) CREAM    1 application to affected area    ONDANSETRON (ZOFRAN) 4 MG TABLET    Take 1 tablet (4 mg total) by mouth every 8 (eight) hours as needed for Nausea.    ONE TOUCH ULTRA TEST STRP        ONE TOUCH ULTRAMINI KIT        ONETOUCH DELICA LANCETS 33 GAUGE MISC        OXYBUTYNIN (DITROPAN-XL) 5 MG TR24    Take 5 mg by mouth once daily.    PANTOPRAZOLE (PROTONIX) 40 MG TABLET    Take 40 mg by mouth 2 (two) times daily.     POTASSIUM CHLORIDE (KLOR-CON) 20 MEQ PACK    Take 20 mEq by mouth once daily.     PROMETHAZINE-DEXTROMETHORPHAN (PROMETHAZINE-DM) 6.25-15 MG/5 ML SYRP    TK 5 ML PO Q 4 TO 6 H FOR 7 DAYS PRN    QROXIN 0.0375-5 % PTMD        ROSUVASTATIN (CRESTOR) 40 MG TAB    Take 40 mg by mouth every evening.     TIZANIDINE 4 MG CAP    Take 4 mg by mouth 3 (three) times daily as needed.     TOBRAMYCIN-DEXAMETHASONE 0.3-0.1% (TOBRADEX) 0.3-0.1 % OINT    3 (three) times daily.    TRIAMCINOLONE ACETONIDE 0.1% (KENALOG) 0.1 % CREAM        VITAMIN B COMPLEX (SUPER B COMPLEX-B-12 ORAL)    Take by mouth once daily.     ZOSTAVAX, PF, 19,400 UNIT/0.65 ML INJECTION       Discontinued Medications    METOPROLOL SUCCINATE (TOPROL-XL) 100 MG 24 HR TABLET    Take 100 mg by mouth 2 (two) times daily.     Review of patient's allergies indicates:   Allergen Reactions    Codeine Shortness Of Breath, Nausea Only and Rash     Reports throat "tightening".    Gabapentin Hallucinations    Amoxicillin Hives    Latex, natural rubber Itching and Rash    Penicillins Hives    Ciprofloxacin Itching    " Lisinopril Other (See Comments)    Nitrofurantoin monohyd/m-cryst Itching    Pregabalin Other (See Comments)    Sulfa (sulfonamide antibiotics) Rash    Valacyclovir Rash     Review of Systems   Constitutional: Negative for decreased appetite.   HENT:  Negative for tinnitus.    Eyes:  Negative for double vision.   Cardiovascular:  Negative for chest pain.   Respiratory:  Negative for wheezing.    Hematologic/Lymphatic: Negative for bleeding problem.   Skin:  Negative for dry skin.   Musculoskeletal:  Positive for arthritis, back pain and joint pain. Negative for gout, neck pain and stiffness.   Gastrointestinal:  Negative for abdominal pain.   Genitourinary:  Negative for bladder incontinence.   Neurological:  Negative for numbness, paresthesias and sensory change.   Psychiatric/Behavioral:  Negative for altered mental status.      Objective:   Body mass index is 36.29 kg/m².  There were no vitals filed for this visit.       General    Constitutional: She is oriented to person, place, and time. She appears well-developed.   HENT:   Head: Atraumatic.   Eyes: EOM are normal.   Pulmonary/Chest: Effort normal.   Neurological: She is alert and oriented to person, place, and time.   Psychiatric: Judgment normal.           Ambulating without any assistive devices   Pelvis is level  Bilateral hips passive motion without pain in the groin  Hip flexors, abductors adductors quads and hamstrings are slightly weak at 5-/5   Right knee with moderate medial joint tenderness and there is crepitus to compression on the patella with range of motion.  Active motion 0-125 degrees.  Collaterals and cruciates are stable.  Very mild swelling.  No defect in the patella or quadriceps tendon .  Mild varus deformity  The left knee with  mild medial joint tenderness.  Mild crepitus to the patella.  No swelling.  Active motion 0-130 degrees.  Collaterals and cruciates stable.  No defect in the patella or quadriceps tendon   Calves are soft  nontender   Pitting edema up to mid tibia   Ankle motion is intact   Skin is warm to touch no obvious lesions  Positive capillary refill less than 2 seconds  Relevant imaging results reviewed and interpreted by me, discussed with the patient and / or family today     X-ray 10/27/2022 bilateral knees right knee with moderate -severe loss of medial joint space.  There was some small marginal osteophytes.  Osteopenic bone no evidence of fracture.  The left knee is mild medial joint narrowing and osteopenic bone  Assessment:     Encounter Diagnoses   Name Primary?    Arthritis of knee, right Yes    Acquired varus deformity knee, right     Chondromalacia, left knee     Edema of lower extremity due to peripheral venous insufficiency         Plan:   Arthritis of knee, right  -     Large Joint Aspiration/Injection: R knee    Acquired varus deformity knee, right    Chondromalacia, left knee    Edema of lower extremity due to peripheral venous insufficiency       Patient Instructions   Your right knee seems to be the 1 that is bothering her the most   You trying to avoid taking Celebrex and too many pills because you have a bad esophagus and I do not want you to take it on a daily basis because in the long run the all affect the kidneys and the liver and could cause ulcers despite the are safe a little bit  You can still use topicals like Voltaren cream or diclofenac gel and only 4% of that gets absorbed in the blood   Will inject the right knee with 80 mg Depo-Medrol/methylprednisolone mixed with 5 cc of 1% lidocaine 02/27/2023   Ice the knee next few days in might hurt a little bit more or swell up but it will go away   As far as the left knee is not as bad as the right side  Will see you back in 3 months will get your insurance to approve you for the gel injection for the right knee        Disclaimer: This note was prepared using a voice recognition system and is likely to have sound alike errors within the text.

## 2023-04-24 NOTE — PROGRESS NOTES
===============================  Date today is 5/1/2023  Faith Valadez is a 77 y.o. female  Last visit [facility]: :4/25/2022   Last visit eye dept. Visit date not found    Corrected distance visual acuity was 20/80 -2 in the right eye and 20/25 in the left eye.  Tonometry       Tonometry (Tonopen, 8:46 AM)         Right Left    Pressure 24 18              Tonometry #2 (Tonopen, 8:48 AM)         Right Left    Pressure 21                   Wearing Rx       Wearing Rx         Sphere Cylinder Axis Add    Right -3.50 +1.50 080 +2.75    Left -2.00 +1.50 085 +2.75      Age: 2yrs                  Manifest Refraction       Manifest Refraction         Sphere Cylinder Axis Dist VA Add    Right -2.00 +1.25 085 20/150     Left -2.25 +1.25 085 20/25+2               Manifest Refraction #2         Sphere Cylinder Axis Dist VA Add    Right -2.50 +1.50 080  +2.75    Left -1.75 +1.25 090  +2.75              Manifest Refraction Comments    Peripherally                  Not recorded       Chief Complaint   Patient presents with    Diabetic Eye Exam     Complete Ocular Exam with GOCT and MOCT  FBS:146 this weekend  A1C 5.5 February       Over the last 2 weeks having a sharp shooting behind OD that occurs a few times a day and lasting a few seconds at a time, also VA has decreased at all ranges in OS over the last 2 months.       Problem List Items Addressed This Visit          Eye/Vision problems    Type 2 diabetes mellitus without complication - Primary    Relevant Orders    Posterior Segment OCT Retina-Both eyes (Completed)     Other Visit Diagnoses       Epiretinal membrane, right        Relevant Orders    Posterior Segment OCT Retina-Both eyes (Completed)    Open angle with borderline findings, low risk, bilateral        Diabetic cataract              Instructed to call 24/7 for any worsening of vision, visual distortion or pain.  Check OU independently daily.    Gave my office and personal cell phone  number.  ________________  5/1/2023 today  Faith Valadez    DM no retinopathy  OD PPV for ERM- S/p 25g ppv/ilm peel/afx od for erm 11/13/2019 / dr brito   PCIOL OD clear capsule  1-2+ NS OS  OD no ERM on exam today, good RPE  SCOAG followed by [facility]  OS no angio  Discussed with patient visual changes likely due to anisocoria   C/o occasional eye pain- pt using Xiidra and thera tears, will try Ivizia BID-TID for 2 weeks, inb consider restasis and lotemax BID for 3 weeks    RTC 1 year  Instructed to call 24/7 for any worsening of vision or symptoms. Check OU daily.   Gave my office and cell phone number.    =============================

## 2023-05-01 ENCOUNTER — OFFICE VISIT (OUTPATIENT)
Dept: OPHTHALMOLOGY | Facility: CLINIC | Age: 78
End: 2023-05-01
Payer: MEDICARE

## 2023-05-01 DIAGNOSIS — H35.371 EPIRETINAL MEMBRANE, RIGHT: ICD-10-CM

## 2023-05-01 DIAGNOSIS — E11.36 DIABETIC CATARACT: ICD-10-CM

## 2023-05-01 DIAGNOSIS — E11.9 TYPE 2 DIABETES MELLITUS WITHOUT COMPLICATION, WITHOUT LONG-TERM CURRENT USE OF INSULIN: Primary | ICD-10-CM

## 2023-05-01 DIAGNOSIS — H40.013 OPEN ANGLE WITH BORDERLINE FINDINGS, LOW RISK, BILATERAL: ICD-10-CM

## 2023-05-01 PROCEDURE — 92134 CPTRZ OPH DX IMG PST SGM RTA: CPT | Mod: PBBFAC | Performed by: OPHTHALMOLOGY

## 2023-05-01 PROCEDURE — 99999 PR PBB SHADOW E&M-EST. PATIENT-LVL III: CPT | Mod: PBBFAC,,, | Performed by: OPHTHALMOLOGY

## 2023-05-01 PROCEDURE — 92014 COMPRE OPH EXAM EST PT 1/>: CPT | Mod: S$PBB,,, | Performed by: OPHTHALMOLOGY

## 2023-05-01 PROCEDURE — 99213 OFFICE O/P EST LOW 20 MIN: CPT | Mod: PBBFAC | Performed by: OPHTHALMOLOGY

## 2023-05-01 PROCEDURE — 92134 POSTERIOR SEGMENT OCT RETINA (OCULAR COHERENCE TOMOGRAPHY)-BOTH EYES: ICD-10-PCS | Mod: 26,S$PBB,, | Performed by: OPHTHALMOLOGY

## 2023-05-01 PROCEDURE — 99999 PR PBB SHADOW E&M-EST. PATIENT-LVL III: ICD-10-PCS | Mod: PBBFAC,,, | Performed by: OPHTHALMOLOGY

## 2023-05-01 PROCEDURE — 92014 PR EYE EXAM, EST PATIENT,COMPREHESV: ICD-10-PCS | Mod: S$PBB,,, | Performed by: OPHTHALMOLOGY

## 2023-05-01 RX ORDER — CARBOXYMETHYLCELLULOSE SODIUM 2.5 MG/ML
1 SOLUTION/ DROPS OPHTHALMIC
COMMUNITY

## 2023-05-25 ENCOUNTER — OFFICE VISIT (OUTPATIENT)
Dept: ORTHOPEDICS | Facility: CLINIC | Age: 78
End: 2023-05-25
Payer: MEDICARE

## 2023-05-25 VITALS — BODY MASS INDEX: 36.52 KG/M2 | WEIGHT: 198.44 LBS | HEIGHT: 62 IN

## 2023-05-25 DIAGNOSIS — M17.11 ARTHRITIS OF KNEE, RIGHT: Primary | ICD-10-CM

## 2023-05-25 DIAGNOSIS — I87.2 EDEMA OF LOWER EXTREMITY DUE TO PERIPHERAL VENOUS INSUFFICIENCY: ICD-10-CM

## 2023-05-25 DIAGNOSIS — M94.262 CHONDROMALACIA, LEFT KNEE: ICD-10-CM

## 2023-05-25 DIAGNOSIS — M21.161 ACQUIRED VARUS DEFORMITY KNEE, RIGHT: ICD-10-CM

## 2023-05-25 PROCEDURE — 99213 PR OFFICE/OUTPT VISIT, EST, LEVL III, 20-29 MIN: ICD-10-PCS | Mod: S$PBB,,, | Performed by: ORTHOPAEDIC SURGERY

## 2023-05-25 PROCEDURE — 99214 OFFICE O/P EST MOD 30 MIN: CPT | Mod: PBBFAC | Performed by: ORTHOPAEDIC SURGERY

## 2023-05-25 PROCEDURE — 99999 PR PBB SHADOW E&M-EST. PATIENT-LVL IV: ICD-10-PCS | Mod: PBBFAC,,, | Performed by: ORTHOPAEDIC SURGERY

## 2023-05-25 PROCEDURE — 99213 OFFICE O/P EST LOW 20 MIN: CPT | Mod: S$PBB,,, | Performed by: ORTHOPAEDIC SURGERY

## 2023-05-25 PROCEDURE — 99999 PR PBB SHADOW E&M-EST. PATIENT-LVL IV: CPT | Mod: PBBFAC,,, | Performed by: ORTHOPAEDIC SURGERY

## 2023-05-25 RX ORDER — CELECOXIB 200 MG/1
200 CAPSULE ORAL DAILY
Qty: 90 CAPSULE | Refills: 3 | Status: SHIPPED | OUTPATIENT
Start: 2023-05-25

## 2023-05-25 NOTE — PROGRESS NOTES
Subjective:     Patient ID: Faith Valadez is a 77 y.o. female.    Chief Complaint: Pain of the Right Knee      HPI:  10/27/2022   Bilateral knee pain right worse than left pain level is around 2/10.  Gives history of right knee arthroscopy I performed years ago.  Also I performed right shoulder rotator cuff surgery.  She is doing really well at this time she does take Celebrex on occasion but not on a daily basis.  She wanted to establish care since she knows were I am right now.  The left knee is not hurting her as much either.  She does ambulate without any assistive devices.  She still working preparing records etcetera.  The arthroscopic surgery performed in 2014 or 15.  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  She does not do it on a daily basis once in a while.  Occasionally she does take Tylenol.  She tries to keep on moving and being very active.    Denies any fever or chills or shortness of breath or difficulty with chewing or swallowing loss of bowel bladder control blurry vision double vision loss sense smell or taste  She does have occasional back pain but able to manage      02/27/2022   Bilateral knee pain with the right greater than the left.  Her pain is 4/10.  She does take Celebrex on occasions but not on a daily basis.  She tries to remain very active and now she has grandchildren that she tries to get active with them.  She tries not to take Celebrex and too many medications which she knows the affect her somehow or the other.  We went over her medications and other treatment plans that could be given to her knees.  She had previous arthroscopic surgeries done years ago by me.  We went over the x-rays with her again today showed her the arthritic changes in the knee joint.  We did discuss briefly treatment of different modalities including steroid injections and viscosupplementation   No fever no chills no shortness of breath or difficulty with  chewing swallowing loss of bowel bladder control blurry vision double vision loss sense smell or taste      05/25/2023  Patient with bilateral knee pain and she is doing okay with pain around 3/10.  Recently she is been taking Celebrex on a daily basis usually she only takes it as needed.  She has both of her ankles swollen and I did tell her sometimes it is from NSAIDs but she does have other problems.  She has erythema around the skin and she sees Dermatology where they did a biopsy and treating her for the above.  She does use cortisone cream /ttiamcinolon cream given to her by dermatology.  He is managing with the Celebrex.  She got approved to get viscosupplementation but at this time she thinks she can hold off.  We looked it up to make sure that she can have it if the pain reoccurs and it was approved till the end of the year.  No fever no chills no shortness of breath no difficulty with chewing swallowing loss of bowel bladder control  Past Medical History:   Diagnosis Date    Arthritis     Cataract     Diabetes mellitus     Essential hypertension 9/4/2020 1:00:21 PM    [facility] Historical - Cardiovascular: Hypertension-No Additional Notes    Essential hypertension 9/4/2020 1:00:21 PM    [facility] Historical - Cardiovascular: Hypertension-No Additional Notes    Glaucoma suspect, both eyes     History of other drug therapy 9/10/2021 2:37:41 PM    [facility] Historical - Unknown: COVID-19 vaccine series completed-No Additional Notes    History of other drug therapy 9/10/2021 2:37:41 PM    [facility] Historical - Unknown: COVID-19 vaccine series completed-No Additional Notes    Hypertension     Open angle with borderline findings, low risk 5/25/2015    Other abnormal glucose 9/4/2020 12:50:16 PM    [facility] Historical - Unknown: Prediabetes-No Additional Notes    Other abnormal glucose 9/4/2020 12:50:16 PM    [facility] Historical - Unknown: Prediabetes-No Additional Notes     Past Surgical  History:   Procedure Laterality Date    Arthroscopic surgery right knee Right     CATARACT EXTRACTION W/  INTRAOCULAR LENS IMPLANT Right 08/26/2020    Rotator cuff repair right shoulder Right     VITRECTOMY BY PARS PLANA APPROACH Right 11/13/2019    Procedure: VITRECTOMY, PARS PLANA APPROACH;  Surgeon: FORTINO Corea MD;  Location: [facility] OR [address] [place] [address];  Service: Ophthalmology;  Laterality: Right;  40 min     Family History   Problem Relation Age of Onset    Glaucoma Father     Cataracts Father     Cancer Father     Diabetes Sister     Cataracts Sister     Cancer Sister     Hypertension Sister     Thyroid disease Brother     Thyroid disease Maternal Aunt     Hypertension Maternal Uncle     Stroke Maternal Grandmother     Diabetes Maternal Grandfather     Stroke Maternal Grandfather     Thyroid disease Paternal Grandmother     Glaucoma Son     Amblyopia Neg Hx     Blindness Neg Hx     Strabismus Neg Hx     Retinal detachment Neg Hx     Macular degeneration Neg Hx      Social History     Socioeconomic History    Marital status: Single   Tobacco Use    Smoking status: Never    Smokeless tobacco: Never   Substance and Sexual Activity    Alcohol use: No   Social History Narrative    ** Merged History Encounter **          Medication List with Changes/Refills   New Medications    CELECOXIB (CELEBREX) 200 MG CAPSULE    Take 1 capsule (200 mg total) by mouth once daily. Take with food   Current Medications    ASPIRIN 81 MG CHEW    Take 81 mg by mouth once daily.     AZELASTINE-FLUTICASONE (DYMISTA) 137-50 MCG/SPRAY SPRY NASSAL SPRAY    2 sprays by Each Nostril route 2 (two) times daily as needed.     CARBOXYMETHYLCELLULOSE SODIUM (THERATEARS) 0.25 % DPET    1 drop as needed.    CARVEDILOL (COREG) 25 MG TABLET    Take 25 mg by mouth 2 (two) times daily.    CELECOXIB (CELEBREX) 200 MG CAPSULE    Take 200 mg by mouth 2 (two) times daily.    CELECOXIB (CELEBREX) 200 MG CAPSULE    Take 1 capsule (200 mg total) by mouth once  daily. Take with food    CHOLECALCIFEROL, VITAMIN D3, 125 MCG (5,000 UNIT) TAB    Take 5,000 Units by mouth once daily.     CLONAZEPAM (KLONOPIN) 0.5 MG TABLET    Take 0.5 mg by mouth 2 (two) times daily as needed.    CYCLOBENZAPRINE (FLEXERIL) 10 MG TABLET    Take 10 mg by mouth 3 (three) times daily as needed.     CYCLOPENTOLATE 1% (CYCLOGYL) 1 % OPHTHALMIC SOLUTION    1 drop once.    DICYCLOMINE (BENTYL) 10 MG CAPSULE    Take 10 mg by mouth 3 (three) times daily.     DIGOXIN (LANOXIN) 250 MCG TABLET    Take 250 mcg by mouth once daily.     DYMISTA 137-50 MCG/SPRAY SPRY        FUROSEMIDE (LASIX) 40 MG TABLET    Take 40 mg by mouth 2 (two) times daily.     GLIPIZIDE (GLUCOTROL) 2.5 MG TR24    Take 2.5 mg by mouth daily with breakfast.     HYDROCORTISONE 2.5 % CREAM    APPLY SMALL AMOUNT OF CREAM TO AFFECTED AREA TWICE DAILY FOR 2 WEEKS THEN AS NEEDED    HYDROCORTISONE-IODOQUINOL-ALOE 1.9-1 % CRPK        HYDROXYZINE (ATARAX) 25 MG TABLET    Take 25 mg by mouth 3 (three) times daily as needed.     LIDOCAINE (LIDODERM) 5 %        LIDOCAINE-PRILOCAINE (EMLA) CREAM        LIFITEGRAST (XIIDRA) 5 % DPET    Place 1 drop into both eyes 2 (two) times daily.    MILNACIPRAN (SAVELLA) 25 MG TAB TABLET    Take 25 mg by mouth 2 (two) times daily.    MONTELUKAST (SINGULAIR) 10 MG TABLET    TAKE 1 TABLET BY MOUTH ONCE DAILY FOR 30 DAYS    MULTIVITAMIN-IRON-FOLIC ACID TAB        NYSTATIN (MYCOSTATIN) CREAM    1 application to affected area    ONDANSETRON (ZOFRAN) 4 MG TABLET    Take 1 tablet (4 mg total) by mouth every 8 (eight) hours as needed for Nausea.    ONE TOUCH ULTRA TEST STRP        ONE TOUCH ULTRAMINI KIT        ONETOUCH DELICA LANCETS 33 GAUGE MISC        OXYBUTYNIN (DITROPAN-XL) 5 MG TR24    Take 5 mg by mouth once daily.    PANTOPRAZOLE (PROTONIX) 40 MG TABLET    Take 40 mg by mouth 2 (two) times daily.     POTASSIUM CHLORIDE (KLOR-CON) 20 MEQ PACK    Take 20 mEq by mouth once daily.     PROMETHAZINE-DEXTROMETHORPHAN  "(PROMETHAZINE-DM) 6.25-15 MG/5 ML SYRP    TK 5 ML PO Q 4 TO 6 H FOR 7 DAYS PRN    QROXIN 0.0375-5 % PTMD        ROSUVASTATIN (CRESTOR) 40 MG TAB    Take 40 mg by mouth every evening.     TIZANIDINE 4 MG CAP    Take 4 mg by mouth 3 (three) times daily as needed.     TRIAMCINOLONE ACETONIDE 0.1% (KENALOG) 0.1 % CREAM        VITAMIN B COMPLEX (SUPER B COMPLEX-B-12 ORAL)    Take by mouth once daily.     ZOSTAVAX, PF, 19,400 UNIT/0.65 ML INJECTION         Review of patient's allergies indicates:   Allergen Reactions    Codeine Shortness Of Breath, Nausea Only and Rash     Reports throat "tightening".    Gabapentin Hallucinations    Amoxicillin Hives    Latex, natural rubber Itching and Rash    Penicillins Hives    Ciprofloxacin Itching    Lisinopril Other (See Comments)    Nitrofurantoin monohyd/m-cryst Itching    Pregabalin Other (See Comments)    Sulfa (sulfonamide antibiotics) Rash    Valacyclovir Rash     Review of Systems   Constitutional: Negative for decreased appetite.   HENT:  Negative for tinnitus.    Eyes:  Negative for double vision.   Cardiovascular:  Negative for chest pain.   Respiratory:  Negative for wheezing.    Hematologic/Lymphatic: Negative for bleeding problem.   Skin:  Negative for dry skin.   Musculoskeletal:  Positive for arthritis, back pain and joint pain. Negative for gout, neck pain and stiffness.   Gastrointestinal:  Negative for abdominal pain.   Genitourinary:  Negative for bladder incontinence.   Neurological:  Negative for numbness, paresthesias and sensory change.   Psychiatric/Behavioral:  Negative for altered mental status.      Objective:   Body mass index is 36.29 kg/m².  There were no vitals filed for this visit.       General    Constitutional: She is oriented to person, place, and time. She appears well-developed.   HENT:   Head: Atraumatic.   Eyes: EOM are normal.   Pulmonary/Chest: Effort normal.   Neurological: She is alert and oriented to person, place, and time. "   Psychiatric: Judgment normal.           Ambulating without any assistive devices   Pelvis is level  Bilateral hips passive motion without pain in the groin  Hip flexors, abductors adductors quads and hamstrings are slightly weak at 5-/5   Right knee with moderate medial joint tenderness and there is crepitus to compression on the patella with range of motion.  Active motion 0-125 degrees.  Collaterals and cruciates are stable.  Very mild swelling.  No defect in the patella or quadriceps tendon .  Mild varus deformity  The left knee with  mild medial joint tenderness.  Mild crepitus to the patella.  No swelling.  Active motion 0-130 degrees.  Collaterals and cruciates stable.  No defect in the patella or quadriceps tendon   Calves are soft nontender   Pitting edema up to mid tibia   Ankle motion is intact is swelling on the dorsum of the foot also  Skin is warm to touch no obvious lesions  Positive capillary refill less than 2 seconds      Relevant imaging results reviewed and interpreted by me, discussed with the patient and / or family today     X-ray 10/27/2022 bilateral knees right knee with moderate -severe loss of medial joint space.  There was some small marginal osteophytes.  Osteopenic bone no evidence of fracture.  The left knee is mild medial joint narrowing and osteopenic bone  Assessment:     Encounter Diagnoses   Name Primary?    Arthritis of knee, right Yes    Acquired varus deformity knee, right     Chondromalacia, left knee     Edema of lower extremity due to peripheral venous insufficiency         Plan:   Arthritis of knee, right  -     celecoxib (CELEBREX) 200 MG capsule; Take 1 capsule (200 mg total) by mouth once daily. Take with food  Dispense: 90 capsule; Refill: 3    Acquired varus deformity knee, right    Chondromalacia, left knee  -     celecoxib (CELEBREX) 200 MG capsule; Take 1 capsule (200 mg total) by mouth once daily. Take with food  Dispense: 90 capsule; Refill: 3    Edema of lower  extremity due to peripheral venous insufficiency         Patient Instructions   Your knee is not hurting you as much right now and would like to hold off on receiving the Monovisc   Continue with the Celebrex since it is helping we will renew that 200 mg once a day as needed   You can still take Tylenol as needed also you can still try diclofenac gel over the knees if needed    I will see you in 3 months if you need to come in sooner to get her Monovisc injection please call us will arrange for that.  Monovisc according to the nurse is good until the end of the year        Disclaimer: This note was prepared using a voice recognition system and is likely to have sound alike errors within the text.

## 2023-05-25 NOTE — PATIENT INSTRUCTIONS
Your knee is not hurting you as much right now and would like to hold off on receiving the Monovisc   Continue with the Celebrex since it is helping we will renew that 200 mg once a day as needed   You can still take Tylenol as needed also you can still try diclofenac gel over the knees if needed    I will see you in 3 months if you need to come in sooner to get her Monovisc injection please call us will arrange for that.  Monovisc according to the nurse is good until the end of the year

## 2023-06-07 DIAGNOSIS — H04.123 DRY EYE SYNDROME, BILATERAL: ICD-10-CM

## 2023-06-07 RX ORDER — LIFITEGRAST 50 MG/ML
1 SOLUTION/ DROPS OPHTHALMIC 2 TIMES DAILY
Qty: 60 EACH | Refills: 11 | Status: SHIPPED | OUTPATIENT
Start: 2023-06-07

## 2023-06-07 RX ORDER — LIFITEGRAST 50 MG/ML
1 SOLUTION/ DROPS OPHTHALMIC 2 TIMES DAILY
Qty: 60 EACH | Refills: 11 | Status: CANCELLED | OUTPATIENT
Start: 2023-06-07

## 2023-08-22 ENCOUNTER — TELEPHONE (OUTPATIENT)
Dept: ORTHOPEDICS | Facility: CLINIC | Age: 78
End: 2023-08-22
Payer: MEDICARE

## 2023-08-22 NOTE — TELEPHONE ENCOUNTER
Returned the patient's phone call in regards to their message. Informed the patient that Dr. Thompson is out of the office this week but I can get them schedule with Mona BENAVIDES for Thursday 08/24/23 at 2:00. Patient states that they need to see Dr. Thompson and discuss other options. Informed the patient that I can get them schedule with Dr. Thompson in October. Patient states that they need something sooner than that. Informed the patient that I can get them schedule and add them to the wait list for a sooner appointment. I got the patient schedule to see Dr. Thompson on 10/02/23 at 7:40. Patient verbalized understanding.             ----- Message from Kenny Montero sent at 8/22/2023  2:21 PM CDT -----  Type:  Patient Returning Call    Who Called:pt  Who Left Message for Patient:  Does the patient know what this is regarding?:appt reschedule  Would the patient rather a call back or a response via [facility]? Call  Best Call Back Number:[phone]  Additional Information: pt states she has a missed appt and would like to reschedule

## 2023-10-02 ENCOUNTER — OFFICE VISIT (OUTPATIENT)
Dept: ORTHOPEDICS | Facility: CLINIC | Age: 78
End: 2023-10-02
Payer: MEDICARE

## 2023-10-02 VITALS — HEIGHT: 62 IN | BODY MASS INDEX: 36.44 KG/M2 | WEIGHT: 198 LBS

## 2023-10-02 DIAGNOSIS — M17.11 ARTHRITIS OF KNEE, RIGHT: ICD-10-CM

## 2023-10-02 DIAGNOSIS — M94.262 CHONDROMALACIA, LEFT KNEE: ICD-10-CM

## 2023-10-02 DIAGNOSIS — L03.116 CELLULITIS OF LEG WITHOUT FOOT, LEFT: Primary | ICD-10-CM

## 2023-10-02 DIAGNOSIS — M85.80 OSTEOPENIA, UNSPECIFIED LOCATION: ICD-10-CM

## 2023-10-02 DIAGNOSIS — M21.161 ACQUIRED VARUS DEFORMITY KNEE, RIGHT: ICD-10-CM

## 2023-10-02 DIAGNOSIS — I87.2 EDEMA OF LOWER EXTREMITY DUE TO PERIPHERAL VENOUS INSUFFICIENCY: ICD-10-CM

## 2023-10-02 PROCEDURE — 99999 PR PBB SHADOW E&M-EST. PATIENT-LVL V: CPT | Mod: PBBFAC,,, | Performed by: ORTHOPAEDIC SURGERY

## 2023-10-02 PROCEDURE — 99213 OFFICE O/P EST LOW 20 MIN: CPT | Mod: S$PBB,,, | Performed by: ORTHOPAEDIC SURGERY

## 2023-10-02 PROCEDURE — 99215 OFFICE O/P EST HI 40 MIN: CPT | Mod: PBBFAC | Performed by: ORTHOPAEDIC SURGERY

## 2023-10-02 PROCEDURE — 99999 PR PBB SHADOW E&M-EST. PATIENT-LVL V: ICD-10-PCS | Mod: PBBFAC,,, | Performed by: ORTHOPAEDIC SURGERY

## 2023-10-02 PROCEDURE — 99213 PR OFFICE/OUTPT VISIT, EST, LEVL III, 20-29 MIN: ICD-10-PCS | Mod: S$PBB,,, | Performed by: ORTHOPAEDIC SURGERY

## 2023-10-02 RX ORDER — ALLOPURINOL 100 MG/1
100 TABLET ORAL
COMMUNITY
Start: 2023-09-06

## 2023-10-02 RX ORDER — CLINDAMYCIN HYDROCHLORIDE 300 MG/1
300 CAPSULE ORAL EVERY 8 HOURS
Qty: 30 CAPSULE | Refills: 1 | Status: SHIPPED | OUTPATIENT
Start: 2023-10-02

## 2023-10-02 NOTE — PATIENT INSTRUCTIONS
Your x-ray from October last year show complete loss of joint space on the inside of her right knee  You have erythema and redness on the left leg consistent with cellulitis below the knee and above the ankle  You part of a study right now to determine dementia/mendoza  You have multiple allergies to different antibiotics will start you on clindamycin 300 3 times a day for 10 days  You already taking clindamycin for some dental work  If the redness and inflammation gets worst in the leg please go to the emergency room  Your right knee is not hurting as much so we will hold off on giving you Monovisc  Will see you back in 3 months

## 2023-10-02 NOTE — PROGRESS NOTES
Subjective:     Patient ID: Faith Valadez is a 78 y.o. female.    Chief Complaint: Pain of the Right Knee      HPI:  10/27/2022   Bilateral knee pain right worse than left pain level is around 2/10.  Gives history of right knee arthroscopy I performed years ago.  Also I performed right shoulder rotator cuff surgery.  She is doing really well at this time she does take Celebrex on occasion but not on a daily basis.  She wanted to establish care since she knows were I am right now.  The left knee is not hurting her as much either.  She does ambulate without any assistive devices.  She still working preparing records etcetera.  The arthroscopic surgery performed in 2014 or 15.  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  She does not do it on a daily basis once in a while.  Occasionally she does take Tylenol.  She tries to keep on moving and being very active.    Denies any fever or chills or shortness of breath or difficulty with chewing or swallowing loss of bowel bladder control blurry vision double vision loss sense smell or taste  She does have occasional back pain but able to manage      02/27/2022   Bilateral knee pain with the right greater than the left.  Her pain is 4/10.  She does take Celebrex on occasions but not on a daily basis.  She tries to remain very active and now she has grandchildren that she tries to get active with them.  She tries not to take Celebrex and too many medications which she knows the affect her somehow or the other.  We went over her medications and other treatment plans that could be given to her knees.  She had previous arthroscopic surgeries done years ago by me.  We went over the x-rays with her again today showed her the arthritic changes in the knee joint.  We did discuss briefly treatment of different modalities including steroid injections and viscosupplementation   No fever no chills no shortness of breath or difficulty with  chewing swallowing loss of bowel bladder control blurry vision double vision loss sense smell or taste      05/25/2023  Patient with bilateral knee pain and she is doing okay with pain around 3/10.  Recently she is been taking Celebrex on a daily basis usually she only takes it as needed.  She has both of her ankles swollen and I did tell her sometimes it is from NSAIDs but she does have other problems.  She has erythema around the skin and she sees Dermatology where they did a biopsy and treating her for the above.  She does use cortisone cream /ttiamcinolon cream given to her by dermatology.  He is managing with the Celebrex.  She got approved to get viscosupplementation but at this time she thinks she can hold off.  We looked it up to make sure that she can have it if the pain reoccurs and it was approved till the end of the year.  No fever no chills no shortness of breath no difficulty with chewing swallowing loss of bowel bladder control      10/02/2023   Bilateral knee arthritis with the right worse than left.  Her pain is 0/10 doing well.  She is working on ankle swelling but it is much better right now.  She started with a day or 2 ago with redness and stinging on the left lower extremity below the knee and above the ankle area of redness.  She said she gets them on and off but started with burning stinging feeling and then got red.  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  She takes occasional Celebrex when needed and she does okay.  On the right knee she has some pain below the knee joint on the medial side   No fever no chills no shortness of breath  She is starting with part of a study at [facility] to work on dementia and early Alzheimer's  Past Medical History:   Diagnosis Date    Arthritis     Cataract     Diabetes mellitus     Essential hypertension 9/4/2020 1:00:21 PM    [facility] Historical -  Cardiovascular: Hypertension-No Additional Notes    Essential hypertension 9/4/2020 1:00:21 PM    [facility] Historical - Cardiovascular: Hypertension-No Additional Notes    Glaucoma suspect, both eyes     History of other drug therapy 9/10/2021 2:37:41 PM    [facility] Historical - Unknown: COVID-19 vaccine series completed-No Additional Notes    History of other drug therapy 9/10/2021 2:37:41 PM    [facility] Historical - Unknown: COVID-19 vaccine series completed-No Additional Notes    Hypertension     Open angle with borderline findings, low risk 5/25/2015    Other abnormal glucose 9/4/2020 12:50:16 PM    [facility] Historical - Unknown: Prediabetes-No Additional Notes    Other abnormal glucose 9/4/2020 12:50:16 PM    [facility] Historical - Unknown: Prediabetes-No Additional Notes     Past Surgical History:   Procedure Laterality Date    Arthroscopic surgery right knee Right     CATARACT EXTRACTION W/  INTRAOCULAR LENS IMPLANT Right 08/26/2020    Rotator cuff repair right shoulder Right     VITRECTOMY BY PARS PLANA APPROACH Right 11/13/2019    Procedure: VITRECTOMY, PARS PLANA APPROACH;  Surgeon: FORTINO Corea MD;  Location: [address];  Service: Ophthalmology;  Laterality: Right;  40 min     Family History   Problem Relation Age of Onset    Glaucoma Father     Cataracts Father     Cancer Father     Diabetes Sister     Cataracts Sister     Cancer Sister     Hypertension Sister     Thyroid disease Brother     Thyroid disease Maternal Aunt     Hypertension Maternal Uncle     Stroke Maternal Grandmother     Diabetes Maternal Grandfather     Stroke Maternal Grandfather     Thyroid disease Paternal Grandmother     Glaucoma Son     Amblyopia Neg Hx     Blindness Neg Hx     Strabismus Neg Hx     Retinal detachment Neg Hx     Macular degeneration Neg Hx      Social History     Socioeconomic History    Marital status: Single   Tobacco Use    Smoking status: Never    Smokeless tobacco: Never    Substance and Sexual Activity    Alcohol use: No   Social History Narrative    ** Merged History Encounter **          Medication List with Changes/Refills   New Medications    CLINDAMYCIN (CLEOCIN) 300 MG CAPSULE    Take 1 capsule (300 mg total) by mouth every 8 (eight) hours.   Current Medications    ALLOPURINOL (ZYLOPRIM) 100 MG TABLET    Take 100 mg by mouth.    ASPIRIN 81 MG CHEW    Take 81 mg by mouth once daily.     AZELASTINE-FLUTICASONE (DYMISTA) 137-50 MCG/SPRAY SPRY NASSAL SPRAY    2 sprays by Each Nostril route 2 (two) times daily as needed.     CARBOXYMETHYLCELLULOSE SODIUM (THERATEARS) 0.25 % DPET    1 drop as needed.    CARVEDILOL (COREG) 25 MG TABLET    Take 25 mg by mouth 2 (two) times daily.    CELECOXIB (CELEBREX) 200 MG CAPSULE    Take 200 mg by mouth 2 (two) times daily.    CELECOXIB (CELEBREX) 200 MG CAPSULE    Take 1 capsule (200 mg total) by mouth once daily. Take with food    CELECOXIB (CELEBREX) 200 MG CAPSULE    Take 1 capsule (200 mg total) by mouth once daily. Take with food    CHOLECALCIFEROL, VITAMIN D3, 125 MCG (5,000 UNIT) TAB    Take 5,000 Units by mouth once daily.     CLONAZEPAM (KLONOPIN) 0.5 MG TABLET    Take 0.5 mg by mouth 2 (two) times daily as needed.    CYCLOBENZAPRINE (FLEXERIL) 10 MG TABLET    Take 10 mg by mouth 3 (three) times daily as needed.     CYCLOPENTOLATE 1% (CYCLOGYL) 1 % OPHTHALMIC SOLUTION    1 drop once.    DICYCLOMINE (BENTYL) 10 MG CAPSULE    Take 10 mg by mouth 3 (three) times daily.     DIGOXIN (LANOXIN) 250 MCG TABLET    Take 250 mcg by mouth once daily.     DYMISTA 137-50 MCG/SPRAY SPRY        FUROSEMIDE (LASIX) 40 MG TABLET    Take 40 mg by mouth 2 (two) times daily.     GLIPIZIDE (GLUCOTROL) 2.5 MG TR24    Take 2.5 mg by mouth daily with breakfast.     HYDROCORTISONE 2.5 % CREAM    APPLY SMALL AMOUNT OF CREAM TO AFFECTED AREA TWICE DAILY FOR 2 WEEKS THEN AS NEEDED    HYDROCORTISONE-IODOQUINOL-ALOE 1.9-1 % CRPK        HYDROXYZINE (ATARAX) 25 MG  "TABLET    Take 25 mg by mouth 3 (three) times daily as needed.     LIDOCAINE (LIDODERM) 5 %        LIDOCAINE-PRILOCAINE (EMLA) CREAM        LIFITEGRAST (XIIDRA) 5 % DPET    Place 1 drop into both eyes 2 (two) times daily.    MILNACIPRAN (SAVELLA) 25 MG TAB TABLET    Take 25 mg by mouth 2 (two) times daily.    MONTELUKAST (SINGULAIR) 10 MG TABLET    TAKE 1 TABLET BY MOUTH ONCE DAILY FOR 30 DAYS    MULTIVITAMIN-IRON-FOLIC ACID TAB        NYSTATIN (MYCOSTATIN) CREAM    1 application to affected area    ONDANSETRON (ZOFRAN) 4 MG TABLET    Take 1 tablet (4 mg total) by mouth every 8 (eight) hours as needed for Nausea.    ONE TOUCH ULTRA TEST STRP        ONE TOUCH ULTRAMINI KIT        ONETOUCH DELICA LANCETS 33 GAUGE MISC        OXYBUTYNIN (DITROPAN-XL) 5 MG TR24    Take 5 mg by mouth once daily.    PANTOPRAZOLE (PROTONIX) 40 MG TABLET    Take 40 mg by mouth 2 (two) times daily.     POTASSIUM CHLORIDE (KLOR-CON) 20 MEQ PACK    Take 20 mEq by mouth once daily.     PROMETHAZINE-DEXTROMETHORPHAN (PROMETHAZINE-DM) 6.25-15 MG/5 ML SYRP    TK 5 ML PO Q 4 TO 6 H FOR 7 DAYS PRN    QROXIN 0.0375-5 % PTMD        ROSUVASTATIN (CRESTOR) 40 MG TAB    Take 40 mg by mouth every evening.     TIZANIDINE 4 MG CAP    Take 4 mg by mouth 3 (three) times daily as needed.     TRIAMCINOLONE ACETONIDE 0.1% (KENALOG) 0.1 % CREAM        VITAMIN B COMPLEX (SUPER B COMPLEX-B-12 ORAL)    Take by mouth once daily.     ZOSTAVAX, PF, 19,400 UNIT/0.65 ML INJECTION         Review of patient's allergies indicates:   Allergen Reactions    Codeine Shortness Of Breath, Nausea Only and Rash     Reports throat "tightening".    Gabapentin Hallucinations    Amoxicillin Hives    Latex, natural rubber Itching and Rash    Penicillins Hives    Ciprofloxacin Itching    Lisinopril Other (See Comments)    Nitrofurantoin monohyd/m-cryst Itching    Pregabalin Other (See Comments)    Sulfa (sulfonamide antibiotics) Rash    Valacyclovir Rash     Review of Systems "   Constitutional: Negative for decreased appetite.   HENT:  Negative for tinnitus.    Eyes:  Negative for double vision.   Cardiovascular:  Negative for chest pain.   Respiratory:  Negative for wheezing.    Hematologic/Lymphatic: Negative for bleeding problem.   Skin:  Negative for dry skin.   Musculoskeletal:  Positive for arthritis, back pain and joint pain. Negative for gout, neck pain and stiffness.   Gastrointestinal:  Negative for abdominal pain.   Genitourinary:  Negative for bladder incontinence.   Neurological:  Negative for numbness, paresthesias and sensory change.   Psychiatric/Behavioral:  Negative for altered mental status.        Objective:   Body mass index is 36.21 kg/m².  There were no vitals filed for this visit.       General    Constitutional: She is oriented to person, place, and time. She appears well-developed.   HENT:   Head: Atraumatic.   Eyes: EOM are normal.   Pulmonary/Chest: Effort normal.   Neurological: She is alert and oriented to person, place, and time.   Psychiatric: Judgment normal.             Ambulating without any assistive devices   Pelvis is level  Bilateral hips passive motion without pain in the groin  Hip flexors, abductors adductors quads and hamstrings are slightly weak at 5-/5   Right knee with mi medial joint tenderness and there is crepitus to compression on the patella with range of motion.  Active motion 0-125 degrees.  Collaterals and cruciates are stable.  Very mild swelling.  No defect in the patella or quadriceps tendon .  Mild varus deformity  The left knee with  mild medial joint tenderness.  Mild crepitus to the patella.  No swelling.  Active motion 0-130 degrees.  Collaterals and cruciates stable.  No defect in the patella or quadriceps tendon   Calves are soft nontender .  Left anterior tibia area of erythema approximately 3 x 5 cm between the knee and the ankle  Pitting edema up to mid tibia   Ankle motion is intact is swelling on the dorsum of the foot  also  Skin is warm to touch no obvious lesions  Positive capillary refill less than 2 seconds      Relevant imaging results reviewed and interpreted by me, discussed with the patient and / or family today     X-ray 10/27/2022 bilateral knees right knee with moderate -severe loss of medial joint space.  There was some small marginal osteophytes.  Osteopenic bone no evidence of fracture.  The left knee is mild medial joint narrowing and osteopenic bone  Assessment:     Encounter Diagnoses   Name Primary?    Arthritis of knee, right     Acquired varus deformity knee, right     Chondromalacia, left knee     Edema of lower extremity due to peripheral venous insufficiency     Osteopenia, unspecified location     Cellulitis of leg without foot, left Yes        Plan:   Cellulitis of leg without foot, left  -     clindamycin (CLEOCIN) 300 MG capsule; Take 1 capsule (300 mg total) by mouth every 8 (eight) hours.  Dispense: 30 capsule; Refill: 1    Arthritis of knee, right    Acquired varus deformity knee, right    Chondromalacia, left knee    Edema of lower extremity due to peripheral venous insufficiency    Osteopenia, unspecified location         Patient Instructions   Your x-ray from October last year show complete loss of joint space on the inside of her right knee  You have erythema and redness on the left leg consistent with cellulitis below the knee and above the ankle  You part of a study right now to determine dementia/mendoza  You have multiple allergies to different antibiotics will start you on clindamycin 300 3 times a day for 10 days  You already taking clindamycin for some dental work  If the redness and inflammation gets worst in the leg please go to the emergency room  Your right knee is not hurting as much so we will hold off on giving you Monovisc  Will see you back in 3 months          Disclaimer: This note was prepared using a voice recognition system and is likely to have sound alike errors within the  text.

## 2023-12-06 ENCOUNTER — OFFICE VISIT (OUTPATIENT)
Dept: OPHTHALMOLOGY | Facility: CLINIC | Age: 78
End: 2023-12-06
Payer: MEDICARE

## 2023-12-06 DIAGNOSIS — H04.123 DRY EYE SYNDROME, BILATERAL: ICD-10-CM

## 2023-12-06 DIAGNOSIS — H35.371 EPIRETINAL MEMBRANE, RIGHT: Primary | ICD-10-CM

## 2023-12-06 DIAGNOSIS — H53.19 PHOTOPSIA OF RIGHT EYE: ICD-10-CM

## 2023-12-06 DIAGNOSIS — H40.013 OPEN ANGLE WITH BORDERLINE FINDINGS, LOW RISK, BILATERAL: ICD-10-CM

## 2023-12-06 DIAGNOSIS — H26.491 RIGHT POSTERIOR CAPSULAR OPACIFICATION: ICD-10-CM

## 2023-12-06 PROCEDURE — 99999 PR PBB SHADOW E&M-EST. PATIENT-LVL III: CPT | Mod: PBBFAC,,, | Performed by: OPTOMETRIST

## 2023-12-06 PROCEDURE — 99999 PR PBB SHADOW E&M-EST. PATIENT-LVL III: ICD-10-PCS | Mod: PBBFAC,,, | Performed by: OPTOMETRIST

## 2023-12-06 PROCEDURE — 99213 OFFICE O/P EST LOW 20 MIN: CPT | Mod: PBBFAC | Performed by: OPTOMETRIST

## 2023-12-06 PROCEDURE — 99214 PR OFFICE/OUTPT VISIT, EST, LEVL IV, 30-39 MIN: ICD-10-PCS | Mod: S$PBB,,, | Performed by: OPTOMETRIST

## 2023-12-06 PROCEDURE — 99214 OFFICE O/P EST MOD 30 MIN: CPT | Mod: S$PBB,,, | Performed by: OPTOMETRIST

## 2023-12-06 NOTE — PROGRESS NOTES
HPI     Eye Problem            Comments: Seeing flashes or blinking lights in both eyes but worse more   on the right eye. Continuous seeing the flashes. Occasionally have some   pain. Like a sudden onset pain for couple seconds and goes away. On Xiidra   gtt.          Comments    1. NS OS   PCIOL OD +19.5 SN60WF (-1.00) 8-     --Dm x since 2009   --erm od (per Dr. Lo If ever has ERM peel would be best to have cat   surg & iol 1st.)   Saw Dr. Lo also 2007 and patient decided against surgery   --Dry eyes   Punctal plugs ([facility]) 6/16/14   restasis   --Ns OS  PCIOL OD per patient  --pvd ou   --S. COAG based on ^c:d all diagnostics wnl (previously by Dr. Breaux)   Phys. Cupping +Fhx father   cct 537/ 540   HVF 11/26/10   tmax 20 OU     S/p 25g ppv/ilm peel/afx od for erm 11/13/2019 / dr daryl Tinoco OU   Xiidra OU BID             Last edited by Vimal Starr on 12/6/2023  3:11 PM.            Assessment /Plan     For exam results, see Encounter Report.    Epiretinal membrane, right  Sees Dr Rey, appears stable at this time. Continue per Dr Rey.     Right posterior capsular opacification  Not visually significant, observe.    Photopsia of right eye  Retinal exam shows no holes/tears on DFE today Strict RD precautions reviewed. Continue per Dr Rey.     Dry eye syndrome, bilateral  Continue OTC preservative free tears 3-4 times daily.    Open angle with borderline findings, low risk, bilateral  Suspect based on CD, continue close observation off drops at this time with gOCT per Dr Rey.     RTC as scheduled with Dr Rey for annual eye exam, sooner if any changes to vision worsening symptoms.

## 2024-01-09 DIAGNOSIS — M25.561 PAIN IN BOTH KNEES, UNSPECIFIED CHRONICITY: Primary | ICD-10-CM

## 2024-01-09 DIAGNOSIS — M25.562 PAIN IN BOTH KNEES, UNSPECIFIED CHRONICITY: Primary | ICD-10-CM

## 2024-01-11 ENCOUNTER — HOSPITAL ENCOUNTER (OUTPATIENT)
Dept: RADIOLOGY | Facility: HOSPITAL | Age: 79
Discharge: HOME | End: 2024-01-11
Attending: ORTHOPAEDIC SURGERY
Payer: MEDICARE

## 2024-01-11 ENCOUNTER — OFFICE VISIT (OUTPATIENT)
Dept: ORTHOPEDICS | Facility: CLINIC | Age: 79
End: 2024-01-11
Payer: MEDICARE

## 2024-01-11 VITALS — HEIGHT: 62 IN | WEIGHT: 198 LBS | BODY MASS INDEX: 36.44 KG/M2

## 2024-01-11 DIAGNOSIS — M25.561 PAIN IN BOTH KNEES, UNSPECIFIED CHRONICITY: ICD-10-CM

## 2024-01-11 DIAGNOSIS — M17.0 BILATERAL PRIMARY OSTEOARTHRITIS OF KNEE: Primary | ICD-10-CM

## 2024-01-11 DIAGNOSIS — M94.262 CHONDROMALACIA, LEFT KNEE: ICD-10-CM

## 2024-01-11 DIAGNOSIS — M17.11 ARTHRITIS OF KNEE, RIGHT: Primary | ICD-10-CM

## 2024-01-11 DIAGNOSIS — I87.2 EDEMA OF LOWER EXTREMITY DUE TO PERIPHERAL VENOUS INSUFFICIENCY: ICD-10-CM

## 2024-01-11 DIAGNOSIS — M85.80 OSTEOPENIA, UNSPECIFIED LOCATION: ICD-10-CM

## 2024-01-11 DIAGNOSIS — M21.161 ACQUIRED VARUS DEFORMITY KNEE, RIGHT: ICD-10-CM

## 2024-01-11 DIAGNOSIS — L03.116 CELLULITIS OF LEG WITHOUT FOOT, LEFT: ICD-10-CM

## 2024-01-11 DIAGNOSIS — M25.562 PAIN IN BOTH KNEES, UNSPECIFIED CHRONICITY: ICD-10-CM

## 2024-01-11 PROCEDURE — 99999 PR PBB SHADOW E&M-EST. PATIENT-LVL IV: CPT | Mod: PBBFAC,,, | Performed by: ORTHOPAEDIC SURGERY

## 2024-01-11 PROCEDURE — 99214 OFFICE O/P EST MOD 30 MIN: CPT | Mod: PBBFAC,25 | Performed by: ORTHOPAEDIC SURGERY

## 2024-01-11 PROCEDURE — 73564 X-RAY EXAM KNEE 4 OR MORE: CPT | Mod: TC,50

## 2024-01-11 PROCEDURE — 20610 DRAIN/INJ JOINT/BURSA W/O US: CPT | Mod: 50,PBBFAC | Performed by: ORTHOPAEDIC SURGERY

## 2024-01-11 PROCEDURE — 99999PBSHW PR PBB SHADOW TECHNICAL ONLY FILED TO HB: Mod: JZ,PBBFAC,,

## 2024-01-11 PROCEDURE — 99214 OFFICE O/P EST MOD 30 MIN: CPT | Mod: 25,S$PBB,, | Performed by: ORTHOPAEDIC SURGERY

## 2024-01-11 PROCEDURE — 73564 X-RAY EXAM KNEE 4 OR MORE: CPT | Mod: 26,50,, | Performed by: RADIOLOGY

## 2024-01-11 RX ORDER — METHYLPREDNISOLONE ACETATE 80 MG/ML
80 INJECTION, SUSPENSION INTRA-ARTICULAR; INTRALESIONAL; INTRAMUSCULAR; SOFT TISSUE
Status: DISCONTINUED | OUTPATIENT
Start: 2024-01-11 | End: 2024-01-11 | Stop reason: HOSPADM

## 2024-01-11 RX ADMIN — Medication 4 ML: at 10:01

## 2024-01-11 RX ADMIN — METHYLPREDNISOLONE ACETATE 80 MG: 80 INJECTION, SUSPENSION INTRALESIONAL; INTRAMUSCULAR; INTRASYNOVIAL; SOFT TISSUE at 10:01

## 2024-01-11 NOTE — PROCEDURES
Large Joint Aspiration/Injection: bilateral knee    Date/Time: 1/11/2024 10:00 AM    Performed by: Francesco Thompson MD  Authorized by: Francesco Thompson MD    Consent Done?:  Yes (Verbal)  Indications:  Arthritis  Site marked: the procedure site was marked    Timeout: prior to procedure the correct patient, procedure, and site was verified      Local anesthesia used?: Yes    Local anesthetic:  Lidocaine 1% without epinephrine    Details:  Needle Size:  22 G  Ultrasonic Guidance for needle placement?: No    Approach:  Anterolateral  Location:  Knee  Laterality:  Bilateral  Site:  Bilateral knee  Medications (Right):  4 mL hyaluronate sodium, stabilized 88 mg/4 mL  Medications (Left):  80 mg methylPREDNISolone acetate 80 mg/mL  Patient tolerance:  Patient tolerated the procedure well with no immediate complications

## 2024-01-11 NOTE — PATIENT INSTRUCTIONS
Continue celebrex as needed   Injection performed of Monovisc in the right knee 01/11/24  Injection of Depo-Medrol mixed with 5 cc of 1% lidocaine performed in the left knee on 01/11/2024   Ice the knees the next few days in might swell up on you  You can add some Tylenol 650 mg twice a day if needed  I will see you back in 6 months

## 2024-01-11 NOTE — PROGRESS NOTES
Subjective:     Patient ID: Faith Valadez is a 78 y.o. female.    Chief Complaint: Pain of the Right Knee      HPI:  10/27/2022   Bilateral knee pain right worse than left pain level is around 2/10.  Gives history of right knee arthroscopy I performed years ago.  Also I performed right shoulder rotator cuff surgery.  She is doing really well at this time she does take Celebrex on occasion but not on a daily basis.  She wanted to establish care since she knows were I am right now.  The left knee is not hurting her as much either.  She does ambulate without any assistive devices.  She still working preparing records etcetera.  The arthroscopic surgery performed in 2014 or 15.  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  She does not do it on a daily basis once in a while.  Occasionally she does take Tylenol.  She tries to keep on moving and being very active.    Denies any fever or chills or shortness of breath or difficulty with chewing or swallowing loss of bowel bladder control blurry vision double vision loss sense smell or taste  She does have occasional back pain but able to manage      02/27/2022   Bilateral knee pain with the right greater than the left.  Her pain is 4/10.  She does take Celebrex on occasions but not on a daily basis.  She tries to remain very active and now she has grandchildren that she tries to get active with them.  She tries not to take Celebrex and too many medications which she knows the affect her somehow or the other.  We went over her medications and other treatment plans that could be given to her knees.  She had previous arthroscopic surgeries done years ago by me.  We went over the x-rays with her again today showed her the arthritic changes in the knee joint.  We did discuss briefly treatment of different modalities including steroid injections and viscosupplementation   No fever no chills no shortness of breath or difficulty with  chewing swallowing loss of bowel bladder control blurry vision double vision loss sense smell or taste      05/25/2023  Patient with bilateral knee pain and she is doing okay with pain around 3/10.  Recently she is been taking Celebrex on a daily basis usually she only takes it as needed.  She has both of her ankles swollen and I did tell her sometimes it is from NSAIDs but she does have other problems.  She has erythema around the skin and she sees Dermatology where they did a biopsy and treating her for the above.  She does use cortisone cream /ttiamcinolon cream given to her by dermatology.  He is managing with the Celebrex.  She got approved to get viscosupplementation but at this time she thinks she can hold off.  We looked it up to make sure that she can have it if the pain reoccurs and it was approved till the end of the year.  No fever no chills no shortness of breath no difficulty with chewing swallowing loss of bowel bladder control      10/02/2023   Bilateral knee arthritis with the right worse than left.  Her pain is 0/10 doing well.  She is working on ankle swelling but it is much better right now.  She started with a day or 2 ago with redness and stinging on the left lower extremity below the knee and above the ankle area of redness.  She said she gets them on and off but started with burning stinging feeling and then got red.  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  She takes occasional Celebrex when needed and she does okay.  On the right knee she has some pain below the knee joint on the medial side   No fever no chills no shortness of breath  She is starting with part of a study at [facility] to work on dementia and early Alzheimer's    01/11/2024  Right knee more painful than the left but they both hurting today at 3/10.  You still maintaining active lifestyle.  You requested injection in both of her knees  today.  You are taking Celebrex not on a daily basis only as needed.  And it helps when you take it.  We discussed Tylenol.    As far as the cellulitis in the legs is completely resolved  You are walking without any assistive devices  Past Medical History:   Diagnosis Date    Arthritis     Cataract     Diabetes mellitus     Essential hypertension 9/4/2020 1:00:21 PM    [facility] Historical - Cardiovascular: Hypertension-No Additional Notes    Essential hypertension 9/4/2020 1:00:21 PM    [facility] Historical - Cardiovascular: Hypertension-No Additional Notes    Glaucoma suspect, both eyes     History of other drug therapy 9/10/2021 2:37:41 PM    [facility] Historical - Unknown: COVID-19 vaccine series completed-No Additional Notes    History of other drug therapy 9/10/2021 2:37:41 PM    [facility] Historical - Unknown: COVID-19 vaccine series completed-No Additional Notes    Hypertension     Open angle with borderline findings, low risk 5/25/2015    Other abnormal glucose 9/4/2020 12:50:16 PM    [facility] Historical - Unknown: Prediabetes-No Additional Notes    Other abnormal glucose 9/4/2020 12:50:16 PM    [facility] Historical - Unknown: Prediabetes-No Additional Notes     Past Surgical History:   Procedure Laterality Date    Arthroscopic surgery right knee Right     CATARACT EXTRACTION W/  INTRAOCULAR LENS IMPLANT Right 08/26/2020    Rotator cuff repair right shoulder Right     VITRECTOMY BY PARS PLANA APPROACH Right 11/13/2019    Procedure: VITRECTOMY, PARS PLANA APPROACH;  Surgeon: FORTINO Corea MD;  Location: [facility] OR [address] [place] [address];  Service: Ophthalmology;  Laterality: Right;  40 min     Family History   Problem Relation Age of Onset    Glaucoma Father     Cataracts Father     Cancer Father     Diabetes Sister     Cataracts Sister     Cancer Sister     Hypertension Sister     Thyroid disease Brother     Thyroid disease Maternal Aunt     Hypertension Maternal Uncle     Stroke Maternal  Grandmother     Diabetes Maternal Grandfather     Stroke Maternal Grandfather     Thyroid disease Paternal Grandmother     Glaucoma Son     Amblyopia Neg Hx     Blindness Neg Hx     Strabismus Neg Hx     Retinal detachment Neg Hx     Macular degeneration Neg Hx      Social History     Socioeconomic History    Marital status: Single   Tobacco Use    Smoking status: Never    Smokeless tobacco: Never   Substance and Sexual Activity    Alcohol use: No   Social History Narrative    ** Merged History Encounter **          Medication List with Changes/Refills   Current Medications    ALLOPURINOL (ZYLOPRIM) 100 MG TABLET    Take 100 mg by mouth.    ASPIRIN 81 MG CHEW    Take 81 mg by mouth once daily.     AZELASTINE-FLUTICASONE (DYMISTA) 137-50 MCG/SPRAY SPRY NASSAL SPRAY    2 sprays by Each Nostril route 2 (two) times daily as needed.     CARBOXYMETHYLCELLULOSE SODIUM (THERATEARS) 0.25 % DPET    1 drop as needed.    CARVEDILOL (COREG) 25 MG TABLET    Take 25 mg by mouth 2 (two) times daily.    CELECOXIB (CELEBREX) 200 MG CAPSULE    Take 1 capsule (200 mg total) by mouth once daily. Take with food    CELECOXIB (CELEBREX) 200 MG CAPSULE    Take 1 capsule (200 mg total) by mouth once daily. Take with food    CHOLECALCIFEROL, VITAMIN D3, 125 MCG (5,000 UNIT) TAB    Take 5,000 Units by mouth once daily.     CLINDAMYCIN (CLEOCIN) 300 MG CAPSULE    Take 1 capsule (300 mg total) by mouth every 8 (eight) hours.    CLONAZEPAM (KLONOPIN) 0.5 MG TABLET    Take 0.5 mg by mouth 2 (two) times daily as needed.    CYCLOBENZAPRINE (FLEXERIL) 10 MG TABLET    Take 10 mg by mouth 3 (three) times daily as needed.     CYCLOPENTOLATE 1% (CYCLOGYL) 1 % OPHTHALMIC SOLUTION    1 drop once.    DICYCLOMINE (BENTYL) 10 MG CAPSULE    Take 10 mg by mouth 3 (three) times daily.     DIGOXIN (LANOXIN) 250 MCG TABLET    Take 250 mcg by mouth once daily.     DYMISTA 137-50 MCG/SPRAY SPRY        FUROSEMIDE (LASIX) 40 MG TABLET    Take 40 mg by mouth 2 (two)  "times daily.     GLIPIZIDE (GLUCOTROL) 2.5 MG TR24    Take 2.5 mg by mouth daily with breakfast.     HYDROCORTISONE 2.5 % CREAM    APPLY SMALL AMOUNT OF CREAM TO AFFECTED AREA TWICE DAILY FOR 2 WEEKS THEN AS NEEDED    HYDROCORTISONE-IODOQUINOL-ALOE 1.9-1 % CRPK        HYDROXYZINE (ATARAX) 25 MG TABLET    Take 25 mg by mouth 3 (three) times daily as needed.     LIDOCAINE (LIDODERM) 5 %        LIDOCAINE-PRILOCAINE (EMLA) CREAM        LIFITEGRAST (XIIDRA) 5 % DPET    Place 1 drop into both eyes 2 (two) times daily.    MILNACIPRAN (SAVELLA) 25 MG TAB TABLET    Take 25 mg by mouth 2 (two) times daily.    MONTELUKAST (SINGULAIR) 10 MG TABLET    TAKE 1 TABLET BY MOUTH ONCE DAILY FOR 30 DAYS    MULTIVITAMIN-IRON-FOLIC ACID TAB        NYSTATIN (MYCOSTATIN) CREAM    1 application to affected area    ONDANSETRON (ZOFRAN) 4 MG TABLET    Take 1 tablet (4 mg total) by mouth every 8 (eight) hours as needed for Nausea.    ONE TOUCH ULTRA TEST STRP        ONE TOUCH ULTRAMINI KIT        ONETOUCH DELICA LANCETS 33 GAUGE MISC        OXYBUTYNIN (DITROPAN-XL) 5 MG TR24    Take 5 mg by mouth once daily.    PANTOPRAZOLE (PROTONIX) 40 MG TABLET    Take 40 mg by mouth 2 (two) times daily.     POTASSIUM CHLORIDE (KLOR-CON) 20 MEQ PACK    Take 20 mEq by mouth once daily.     PROMETHAZINE-DEXTROMETHORPHAN (PROMETHAZINE-DM) 6.25-15 MG/5 ML SYRP    TK 5 ML PO Q 4 TO 6 H FOR 7 DAYS PRN    QROXIN 0.0375-5 % PTMD        ROSUVASTATIN (CRESTOR) 40 MG TAB    Take 40 mg by mouth every evening.     TIZANIDINE 4 MG CAP    Take 4 mg by mouth 3 (three) times daily as needed.     TRIAMCINOLONE ACETONIDE 0.1% (KENALOG) 0.1 % CREAM        VITAMIN B COMPLEX (SUPER B COMPLEX-B-12 ORAL)    Take by mouth once daily.     ZOSTAVAX, PF, 19,400 UNIT/0.65 ML INJECTION         Review of patient's allergies indicates:   Allergen Reactions    Codeine Shortness Of Breath, Nausea Only and Rash     Reports throat "tightening".    Gabapentin Hallucinations    Amoxicillin " Hives    Latex, natural rubber Itching and Rash    Penicillins Hives    Ciprofloxacin Itching    Lisinopril Other (See Comments)    Nitrofurantoin monohyd/m-cryst Itching    Pregabalin Other (See Comments)    Sulfa (sulfonamide antibiotics) Rash    Valacyclovir Rash     Review of Systems   Constitutional: Negative for decreased appetite.   HENT:  Negative for tinnitus.    Eyes:  Negative for double vision.   Cardiovascular:  Negative for chest pain.   Respiratory:  Negative for wheezing.    Hematologic/Lymphatic: Negative for bleeding problem.   Skin:  Negative for dry skin.   Musculoskeletal:  Positive for arthritis, back pain and joint pain. Negative for gout, neck pain and stiffness.   Gastrointestinal:  Negative for abdominal pain.   Genitourinary:  Negative for bladder incontinence.   Neurological:  Negative for numbness, paresthesias and sensory change.   Psychiatric/Behavioral:  Negative for altered mental status.        Objective:   Body mass index is 36.21 kg/m².  There were no vitals filed for this visit.       General    Constitutional: She is oriented to person, place, and time. She appears well-developed.   HENT:   Head: Atraumatic.   Eyes: EOM are normal.   Pulmonary/Chest: Effort normal.   Neurological: She is alert and oriented to person, place, and time.   Psychiatric: Judgment normal.             Ambulating without any assistive devices   Pelvis is level  Bilateral hips passive motion without pain in the groin  Hip flexors, abductors adductors quads and hamstrings are slightly weak at 5-/5   Right knee with moderate medial joint tenderness and there is crepitus to compression on the patella with range of motion.  Active motion 0-125 degrees.  Collaterals and cruciates are stable.  Very mild swelling.  No defect in the patella or quadriceps tendon .  Mild varus deformity  The left knee with  mild -moderate medial joint tenderness.  Moderate-severe crepitus to the patella.  Mild swelling.  Active  motion 0-130 degrees.  Collaterals and cruciates stable.  No defect in the patella or quadriceps tendon   Calves are soft nontender .  Left anterior tibia area of erythema approximately 3 x 5 cm between the knee and the ankle  Pitting edema up to mid tibia   Ankle motion is intact is swelling on the dorsum of the foot also  Skin is warm to touch no obvious lesions  Positive capillary refill less than 2 seconds      Relevant imaging results reviewed and interpreted by me, discussed with the patient and / or family today   X-ray 1/11/24 right knee with moderately severe medial joint narrowing still have around 1 mm of joint space left with small marginal osteophytic changes, no fracture seen.  Left knee with mild to moderate medial joint narrowing and very small marginal osteophytes no fracture seen  X-ray 10/27/2022 bilateral knees right knee with moderate -severe loss of medial joint space.  There was some small marginal osteophytes.  Osteopenic bone no evidence of fracture.  The left knee is mild medial joint narrowing and osteopenic bone  Assessment:     Encounter Diagnoses   Name Primary?    Arthritis of knee, right Yes    Acquired varus deformity knee, right     Chondromalacia, left knee     Cellulitis of leg without foot, left     Edema of lower extremity due to peripheral venous insufficiency     Osteopenia, unspecified location         Plan:   Arthritis of knee, right  -     Large Joint Aspiration/Injection: bilateral knee    Acquired varus deformity knee, right    Chondromalacia, left knee  -     Large Joint Aspiration/Injection: bilateral knee    Cellulitis of leg without foot, left    Edema of lower extremity due to peripheral venous insufficiency    Osteopenia, unspecified location         Patient Instructions   Continue celebrex as needed   Injection performed of Monovisc in the right knee 01/11/24  Injection of Depo-Medrol mixed with 5 cc of 1% lidocaine performed in the left knee on 01/11/2024   Ice  the knees the next few days in might swell up on you  You can add some Tylenol 650 mg twice a day if needed  I will see you back in 6 months        Disclaimer: This note was prepared using a voice recognition system and is likely to have sound alike errors within the text.

## 2024-05-21 ENCOUNTER — OFFICE VISIT (OUTPATIENT)
Dept: OPHTHALMOLOGY | Facility: CLINIC | Age: 79
End: 2024-05-21
Payer: MEDICARE

## 2024-05-21 DIAGNOSIS — H40.013 OPEN ANGLE WITH BORDERLINE FINDINGS, LOW RISK, BILATERAL: ICD-10-CM

## 2024-05-21 DIAGNOSIS — H35.371 EPIRETINAL MEMBRANE, RIGHT: Primary | ICD-10-CM

## 2024-05-21 DIAGNOSIS — E11.9 TYPE 2 DIABETES MELLITUS WITHOUT COMPLICATION, WITHOUT LONG-TERM CURRENT USE OF INSULIN: ICD-10-CM

## 2024-05-21 PROCEDURE — 99213 OFFICE O/P EST LOW 20 MIN: CPT | Mod: PBBFAC | Performed by: OPHTHALMOLOGY

## 2024-05-21 PROCEDURE — 92134 CPTRZ OPH DX IMG PST SGM RTA: CPT | Mod: PBBFAC | Performed by: OPHTHALMOLOGY

## 2024-05-21 PROCEDURE — 99214 OFFICE O/P EST MOD 30 MIN: CPT | Mod: S$PBB,,, | Performed by: OPHTHALMOLOGY

## 2024-05-21 PROCEDURE — 99999 PR PBB SHADOW E&M-EST. PATIENT-LVL III: CPT | Mod: PBBFAC,,, | Performed by: OPHTHALMOLOGY

## 2024-05-21 NOTE — PROGRESS NOTES
===============================  Date today is 5/21/2024  Faith Valadez is a 78 y.o. female  Last visit [facility]: :5/1/2023   Last visit eye dept. 12/6/2023    Corrected distance visual acuity was 20/60 in the right eye and 20/40 -2 in the left eye.  Tonometry       Tonometry (Tonopen, 7:57 AM)         Right Left    Pressure 10 13                  Wearing Rx       Wearing Rx         Sphere Cylinder Axis Add    Right -3.50 +1.50 080 +2.75    Left -2.00 +1.50 085 +2.75                  Manifest Refraction       Manifest Refraction         Sphere Cylinder [place] Dist VA    Right -2.50 +1.75 090 20/30-    Left -1.75 +1.25 090 20/30                  Not recorded       Chief Complaint   Patient presents with    Blurred Vision     Pt is here for Blurry va. Pt states her va has gotten better in the past week, she was using Alprenolol and it made her va blurry with headaches.      HPI     Blurred Vision     Additional comments: Pt is here for Blurry va. Pt states her va has   gotten better in the past week, she was using Alprenolol and it made her   va blurry with headaches.            Comments    1. NS OS   PCIOL OD +19.5 SN60WF (-1.00) 8-     --Dm x since 2009   --erm od (per Dr. Lo If ever has ERM peel would be best to have cat   surg & iol 1st.)   Saw Dr. Lo also 2007 and patient decided against surgery   --Dry eyes   Punctal plugs (oasis) 6/16/14   restasis   --Ns OS  PCIOL OD per patient  --pvd ou   --S. COAG based on ^c:d all diagnostics wnl (previously by Dr. Breaux)   Phys. Cupping +Fhx father   cct 537/ 540   HVF 11/26/10   tmax 20 OU     S/p 25g ppv/ilm peel/afx od for erm 11/13/2019 / dr daryl Tinoco OU   Xiidra OU BID             Last edited by Gertrudis Maldonado on 5/21/2024  7:52 AM.      Problem List Items Addressed This Visit          Eye/Vision problems    Type 2 diabetes mellitus without complication     Other Visit Diagnoses       Epiretinal membrane, right    -  Primary    Relevant  Orders    Posterior Segment OCT Retina-Both eyes (Completed)    Open angle with borderline findings, low risk, bilateral              Instructed to call 24/7 for any worsening of vision, visual distortion or pain.  Check OU independently daily.    Gave my office and personal cell phone number.  ________________  5/21/2024 today  Faith Valadez    DM no retinopathy  OCT stable OU  PCIOL OD  2+ vacuole cataract OS  OD sharp disc 0.7 thin rim  Macula looks good  OS c/d 0.7  Post PRP OS  No new bleeding  Ok to follow yearly  Update glasses today    RTC 1 year with 24-2 VF/RNFL dilate  Instructed to call 24/7 for any worsening of vision or symptoms. Check OU daily.   Gave my office and cell phone number.      =============================

## 2024-07-11 ENCOUNTER — TELEPHONE (OUTPATIENT)
Dept: ORTHOPEDICS | Facility: CLINIC | Age: 79
End: 2024-07-11
Payer: MEDICARE

## 2024-07-11 NOTE — TELEPHONE ENCOUNTER
Called the patient to reschedule their appointment. Left a message for the patient to give the office a call back to get them reschedule with Mrs. Mona Hooks PA-C or Dr. Thompson.

## 2024-07-15 ENCOUNTER — OFFICE VISIT (OUTPATIENT)
Dept: OPHTHALMOLOGY | Facility: CLINIC | Age: 79
End: 2024-07-15
Payer: MEDICARE

## 2024-07-15 DIAGNOSIS — H40.013 OPEN ANGLE WITH BORDERLINE FINDINGS, LOW RISK, BILATERAL: ICD-10-CM

## 2024-07-15 DIAGNOSIS — E11.9 TYPE 2 DIABETES MELLITUS WITHOUT COMPLICATION, WITHOUT LONG-TERM CURRENT USE OF INSULIN: Primary | ICD-10-CM

## 2024-07-15 DIAGNOSIS — H18.513 FUCHS' CORNEAL DYSTROPHY OF BOTH EYES: ICD-10-CM

## 2024-07-15 PROCEDURE — 99212 OFFICE O/P EST SF 10 MIN: CPT | Mod: PBBFAC | Performed by: OPHTHALMOLOGY

## 2024-07-15 PROCEDURE — 99999 PR PBB SHADOW E&M-EST. PATIENT-LVL II: CPT | Mod: PBBFAC,,, | Performed by: OPHTHALMOLOGY

## 2024-07-15 PROCEDURE — 99214 OFFICE O/P EST MOD 30 MIN: CPT | Mod: S$PBB,,, | Performed by: OPHTHALMOLOGY

## 2024-07-15 NOTE — PROGRESS NOTES
"      ===============================  Date today is 7/15/2024  Faith Valadez is a 78 y.o. female  Last visit [facility]: :5/21/2024   Last visit eye dept. 5/21/2024    Uncorrected distance visual acuity was 20/60 in the right eye and 20/40- in the left eye.  Not recorded       Not recorded       Not recorded       Not recorded       Chief Complaint   Patient presents with    Eye Problem     VA getting "smallr & smaller" in the sun       HPI     Eye Problem            Comments: VA getting "smallr & smaller" in the sun            Comments    States that since April she has been seeing twinkling that has gotten   progressively worse. States she has been having headaches and had a fall   back in June. States that she has been dehydrated also.     1. NS OS   PCIOL OD +19.5 SN60WF (-1.00) 8-     --Dm x since 2009   --erm od (per Dr. Lo If ever has ERM peel would be best to have cat   surg & iol 1st.)   Saw Dr. Lo also 2007 and patient decided against surgery   --Dry eyes   Punctal plugs (oasis) 6/16/14   restasis   --Ns OS  PCIOL OD per patient  --pvd ou   --S. COAG based on ^c:d all diagnostics wnl (previously by Dr. Breaux)   Phys. Cupping +Fhx father   cct 537/ 540   HVF 11/26/10   tmax 20 OU     S/p 25g ppv/ilm peel/afx od for erm 11/13/2019 / dr daryl Tinoco OU   Xiidra OU BID             Last edited by Zabrina Pat on 7/15/2024  2:17 PM.      Problem List Items Addressed This Visit          Eye/Vision problems    Type 2 diabetes mellitus without complication - Primary     Other Visit Diagnoses       Open angle with borderline findings, low risk, bilateral        Fuchs' corneal dystrophy of both eyes              Instructed to call 24/7 for any worsening of vision, visual distortion or pain.  Check OU independently daily.    Gave my office and personal cell phone number.  ________________  7/15/2024 today  Faith Valadez    Ppv erm od  11/19  Oct no change od   Os oct nl good    C/o " "non positional "twinkling"  Floaters   Fuch's   Few guttata  Do cct  Rec ivizia tears, tyree 128 QID for 2 weeks, then BID   Recent double of lasix  Pretibial edema  If no better, consider cornea consult    RTC 1 year  Instructed to call 24/7 for any worsening of vision or symptoms. Check OU daily.   Gave my office and cell phone number.      =============================        "

## 2024-08-09 ENCOUNTER — OFFICE VISIT (OUTPATIENT)
Dept: NEPHROLOGY | Facility: CLINIC | Age: 79
End: 2024-08-09
Payer: MEDICARE

## 2024-08-09 VITALS
DIASTOLIC BLOOD PRESSURE: 58 MMHG | SYSTOLIC BLOOD PRESSURE: 116 MMHG | BODY MASS INDEX: 32.27 KG/M2 | HEART RATE: 64 BPM | HEIGHT: 63 IN | WEIGHT: 182.13 LBS

## 2024-08-09 DIAGNOSIS — I10 PRIMARY HYPERTENSION: ICD-10-CM

## 2024-08-09 DIAGNOSIS — M10.00 IDIOPATHIC GOUT, UNSPECIFIED CHRONICITY, UNSPECIFIED SITE: ICD-10-CM

## 2024-08-09 DIAGNOSIS — N18.31 STAGE 3A CHRONIC KIDNEY DISEASE: ICD-10-CM

## 2024-08-09 DIAGNOSIS — N17.9 AKI (ACUTE KIDNEY INJURY): Primary | ICD-10-CM

## 2024-08-09 PROCEDURE — 99213 OFFICE O/P EST LOW 20 MIN: CPT | Mod: PBBFAC | Performed by: INTERNAL MEDICINE

## 2024-08-09 PROCEDURE — 99999 PR PBB SHADOW E&M-EST. PATIENT-LVL III: CPT | Mod: PBBFAC,,, | Performed by: INTERNAL MEDICINE

## 2024-08-21 ENCOUNTER — LAB VISIT (OUTPATIENT)
Dept: LAB | Facility: HOSPITAL | Age: 79
End: 2024-08-21
Attending: INTERNAL MEDICINE
Payer: MEDICARE

## 2024-08-21 DIAGNOSIS — I10 PRIMARY HYPERTENSION: ICD-10-CM

## 2024-08-21 DIAGNOSIS — N18.31 STAGE 3A CHRONIC KIDNEY DISEASE: ICD-10-CM

## 2024-08-21 DIAGNOSIS — N17.9 AKI (ACUTE KIDNEY INJURY): ICD-10-CM

## 2024-08-21 DIAGNOSIS — M10.00 IDIOPATHIC GOUT, UNSPECIFIED CHRONICITY, UNSPECIFIED SITE: ICD-10-CM

## 2024-08-21 LAB
25(OH)D3+25(OH)D2 SERPL-MCNC: 104 NG/ML (ref 30–96)
ALBUMIN SERPL BCP-MCNC: 3.8 G/DL (ref 3.5–5.2)
ANION GAP SERPL CALC-SCNC: 8 MMOL/L (ref 8–16)
BUN SERPL-MCNC: 25 MG/DL (ref 8–23)
CALCIUM SERPL-MCNC: 10.1 MG/DL (ref 8.7–10.5)
CHLORIDE SERPL-SCNC: 104 MMOL/L (ref 95–110)
CO2 SERPL-SCNC: 29 MMOL/L (ref 23–29)
CREAT SERPL-MCNC: 1.3 MG/DL (ref 0.5–1.4)
EST. GFR  (NO RACE VARIABLE): 42.1 ML/MIN/1.73 M^2
GLUCOSE SERPL-MCNC: 76 MG/DL (ref 70–110)
PHOSPHATE SERPL-MCNC: 3.5 MG/DL (ref 2.7–4.5)
POTASSIUM SERPL-SCNC: 4 MMOL/L (ref 3.5–5.1)
PTH-INTACT SERPL-MCNC: 50.2 PG/ML (ref 9–77)
SODIUM SERPL-SCNC: 141 MMOL/L (ref 136–145)
URATE SERPL-MCNC: 5.7 MG/DL (ref 2.4–5.7)

## 2024-08-21 PROCEDURE — 36415 COLL VENOUS BLD VENIPUNCTURE: CPT | Performed by: INTERNAL MEDICINE

## 2024-08-21 PROCEDURE — 82306 VITAMIN D 25 HYDROXY: CPT | Performed by: INTERNAL MEDICINE

## 2024-08-21 PROCEDURE — 84550 ASSAY OF BLOOD/URIC ACID: CPT | Performed by: INTERNAL MEDICINE

## 2024-08-21 PROCEDURE — 83970 ASSAY OF PARATHORMONE: CPT | Performed by: INTERNAL MEDICINE

## 2024-08-21 PROCEDURE — 80069 RENAL FUNCTION PANEL: CPT | Performed by: INTERNAL MEDICINE

## 2024-08-22 ENCOUNTER — TELEPHONE (OUTPATIENT)
Dept: NEPHROLOGY | Facility: CLINIC | Age: 79
End: 2024-08-22
Payer: MEDICARE

## 2024-08-22 ENCOUNTER — LAB VISIT (OUTPATIENT)
Dept: LAB | Facility: HOSPITAL | Age: 79
End: 2024-08-22
Attending: INTERNAL MEDICINE
Payer: MEDICARE

## 2024-08-22 DIAGNOSIS — N18.31 STAGE 3A CHRONIC KIDNEY DISEASE: ICD-10-CM

## 2024-08-22 DIAGNOSIS — N18.31 STAGE 3A CHRONIC KIDNEY DISEASE: Primary | ICD-10-CM

## 2024-08-22 LAB
BILIRUB UR QL STRIP: NEGATIVE
CLARITY UR: CLEAR
COLOR UR: YELLOW
CREAT UR-MCNC: 47 MG/DL (ref 15–325)
GLUCOSE UR QL STRIP: NEGATIVE
HGB UR QL STRIP: NEGATIVE
KETONES UR QL STRIP: NEGATIVE
LEUKOCYTE ESTERASE UR QL STRIP: NEGATIVE
NITRITE UR QL STRIP: NEGATIVE
PH UR STRIP: 7 [PH] (ref 5–8)
PROT UR QL STRIP: NEGATIVE
PROT UR-MCNC: <7 MG/DL (ref 0–15)
PROT/CREAT UR: NORMAL MG/G{CREAT} (ref 0–0.2)
SP GR UR STRIP: 1.01 (ref 1–1.03)
URN SPEC COLLECT METH UR: NORMAL

## 2024-08-22 PROCEDURE — 84156 ASSAY OF PROTEIN URINE: CPT | Performed by: INTERNAL MEDICINE

## 2024-08-22 PROCEDURE — 81003 URINALYSIS AUTO W/O SCOPE: CPT | Performed by: INTERNAL MEDICINE

## 2024-08-22 NOTE — TELEPHONE ENCOUNTER
----- Message from Shauna Mir sent at 8/22/2024  1:24 PM CDT -----  Regarding: Lab Orders  Patient dropped off urine sample today, please put in new orders for urine. We cannot use old orders because they are cancelled at the end of the day, thanks!

## 2024-09-06 ENCOUNTER — TELEPHONE (OUTPATIENT)
Dept: NEPHROLOGY | Facility: CLINIC | Age: 79
End: 2024-09-06
Payer: MEDICARE

## 2024-09-06 NOTE — TELEPHONE ENCOUNTER
----- Message from Elgin Greenfield sent at 9/5/2024  5:16 PM CDT -----  Type:  Sooner Apoointment Request    Caller is requesting a sooner appointment.  Caller declined first available appointment listed below.  Caller will not accept being placed on the waitlist and is requesting a message be sent to doctor.  Name of Caller:Faith Valadez  When is the first available appointment?10/08  Symptoms: EP/8 week f/u//[facility]  Would the patient rather a call back or a response via [facility]? Call back  Best Call Back Number:[phone]   Additional Information: pt would like to be schedule for a sooner date if possible. Please reach out to patient with further.

## 2024-10-09 ENCOUNTER — LAB VISIT (OUTPATIENT)
Dept: LAB | Facility: HOSPITAL | Age: 79
End: 2024-10-09
Attending: INTERNAL MEDICINE
Payer: MEDICARE

## 2024-10-09 DIAGNOSIS — I10 PRIMARY HYPERTENSION: ICD-10-CM

## 2024-10-09 DIAGNOSIS — N18.31 STAGE 3A CHRONIC KIDNEY DISEASE: ICD-10-CM

## 2024-10-09 DIAGNOSIS — M10.00 IDIOPATHIC GOUT, UNSPECIFIED CHRONICITY, UNSPECIFIED SITE: ICD-10-CM

## 2024-10-09 DIAGNOSIS — N17.9 AKI (ACUTE KIDNEY INJURY): ICD-10-CM

## 2024-10-09 LAB
ALBUMIN SERPL BCP-MCNC: 3.5 G/DL (ref 3.5–5.2)
ANION GAP SERPL CALC-SCNC: 8 MMOL/L (ref 8–16)
BUN SERPL-MCNC: 17 MG/DL (ref 8–23)
CALCIUM SERPL-MCNC: 9.7 MG/DL (ref 8.7–10.5)
CHLORIDE SERPL-SCNC: 108 MMOL/L (ref 95–110)
CO2 SERPL-SCNC: 24 MMOL/L (ref 23–29)
CREAT SERPL-MCNC: 1.2 MG/DL (ref 0.5–1.4)
EST. GFR  (NO RACE VARIABLE): 46 ML/MIN/1.73 M^2
GLUCOSE SERPL-MCNC: 119 MG/DL (ref 70–110)
PHOSPHATE SERPL-MCNC: 3.3 MG/DL (ref 2.7–4.5)
POTASSIUM SERPL-SCNC: 4 MMOL/L (ref 3.5–5.1)
SODIUM SERPL-SCNC: 140 MMOL/L (ref 136–145)

## 2024-10-09 PROCEDURE — 36415 COLL VENOUS BLD VENIPUNCTURE: CPT | Performed by: INTERNAL MEDICINE

## 2024-10-09 PROCEDURE — 80069 RENAL FUNCTION PANEL: CPT | Performed by: INTERNAL MEDICINE

## 2024-10-16 ENCOUNTER — OFFICE VISIT (OUTPATIENT)
Dept: NEPHROLOGY | Facility: CLINIC | Age: 79
End: 2024-10-16
Payer: MEDICARE

## 2024-10-16 VITALS
WEIGHT: 177.94 LBS | SYSTOLIC BLOOD PRESSURE: 138 MMHG | DIASTOLIC BLOOD PRESSURE: 64 MMHG | BODY MASS INDEX: 32.74 KG/M2 | HEIGHT: 62 IN | HEART RATE: 91 BPM

## 2024-10-16 DIAGNOSIS — M10.00 IDIOPATHIC GOUT, UNSPECIFIED CHRONICITY, UNSPECIFIED SITE: ICD-10-CM

## 2024-10-16 DIAGNOSIS — N18.31 STAGE 3A CHRONIC KIDNEY DISEASE: Primary | ICD-10-CM

## 2024-10-16 DIAGNOSIS — I10 PRIMARY HYPERTENSION: ICD-10-CM

## 2024-10-16 PROCEDURE — 99214 OFFICE O/P EST MOD 30 MIN: CPT | Mod: S$PBB,,, | Performed by: INTERNAL MEDICINE

## 2024-10-16 PROCEDURE — 99999 PR PBB SHADOW E&M-EST. PATIENT-LVL IV: CPT | Mod: PBBFAC,,, | Performed by: INTERNAL MEDICINE

## 2024-10-16 PROCEDURE — 99214 OFFICE O/P EST MOD 30 MIN: CPT | Mod: PBBFAC | Performed by: INTERNAL MEDICINE

## 2024-10-16 NOTE — PROGRESS NOTES
"Subjective:       Patient ID: Faith Valadez is a 79 y.o. female.    Chief Complaint:  CKD 3, hypertension    HPI    She presents to clinic today for routine follow-up.  Since her last office visit she has been doing well and has no specific or new complaints.  Her laboratory studies and medications were reviewed.  All Nephrology related questions were answered to her satisfaction.    Review of Systems   Constitutional: Negative.    HENT: Negative.     Respiratory: Negative.     Cardiovascular:  Positive for leg swelling.   Gastrointestinal: Negative.    Genitourinary: Negative.    Musculoskeletal: Negative.    Skin: Negative.        /64 (BP Location: Left arm, Patient Position: Sitting)   Pulse 91   Ht 5' 2" (1.575 m)   Wt 80.7 kg (177 lb 14.6 oz)   BMI 32.54 kg/m²     Lab Results   Component Value Date    WBC 8.90 08/08/2016    HGB 12.2 08/08/2016    HCT 35.7 (L) 08/08/2016    MCV 87 08/08/2016     08/08/2016      BMP  Lab Results   Component Value Date     10/09/2024    K 4.0 10/09/2024     10/09/2024    CO2 24 10/09/2024    BUN 17 10/09/2024    CREATININE 1.2 10/09/2024    CALCIUM 9.7 10/09/2024    ANIONGAP 8 10/09/2024    ESTGFRAFRICA 58.8 (A) 08/08/2016    EGFRNONAA 51.0 (A) 08/08/2016     CMP  Sodium   Date Value Ref Range Status   10/09/2024 140 136 - 145 mmol/L Final     Potassium   Date Value Ref Range Status   10/09/2024 4.0 3.5 - 5.1 mmol/L Final     Chloride   Date Value Ref Range Status   10/09/2024 108 95 - 110 mmol/L Final     CO2   Date Value Ref Range Status   10/09/2024 24 23 - 29 mmol/L Final     Glucose   Date Value Ref Range Status   10/09/2024 119 (H) 70 - 110 mg/dL Final     BUN   Date Value Ref Range Status   10/09/2024 17 8 - 23 mg/dL Final     Creatinine   Date Value Ref Range Status   10/09/2024 1.2 0.5 - 1.4 mg/dL Final     Calcium   Date Value Ref Range Status   10/09/2024 9.7 8.7 - 10.5 mg/dL Final     Total Protein   Date Value Ref Range Status "   08/08/2016 7.3 6.0 - 8.4 g/dL Final     Albumin   Date Value Ref Range Status   10/09/2024 3.5 3.5 - 5.2 g/dL Final     Total Bilirubin   Date Value Ref Range Status   08/08/2016 0.3 0.1 - 1.0 mg/dL Final     Comment:     For infants and newborns, interpretation of results should be based  on gestational age, weight and in agreement with clinical  observations.  Premature Infant recommended reference ranges:  Up to 24 hours.............<8.0 mg/dL  Up to 48 hours............<12.0 mg/dL  3-5 days..................<15.0 mg/dL  6-29 days.................<15.0 mg/dL       Alkaline Phosphatase   Date Value Ref Range Status   08/08/2016 65 55 - 135 U/L Final     AST   Date Value Ref Range Status   08/08/2016 20 10 - 40 U/L Final     ALT   Date Value Ref Range Status   08/08/2016 19 10 - 44 U/L Final     Anion Gap   Date Value Ref Range Status   10/09/2024 8 8 - 16 mmol/L Final     eGFR if    Date Value Ref Range Status   08/08/2016 58.8 (A) >60 mL/min/1.73 m^2 Final     eGFR if non    Date Value Ref Range Status   08/08/2016 51.0 (A) >60 mL/min/1.73 m^2 Final     Comment:     Calculation used to obtain the estimated glomerular filtration  rate (eGFR) is the CKD-EPI equation. Since race is unknown   in our information system, the eGFR values for   -American and Non--American patients are given   for each creatinine result.       Current Outpatient Medications on File Prior to Visit   Medication Sig Dispense Refill    aspirin 81 MG Chew Take 81 mg by mouth once daily.       carboxymethylcellulose sodium (THERATEARS) 0.25 % Dpet 1 drop as needed.      carvediloL (COREG) 25 MG tablet Take 25 mg by mouth 2 (two) times daily.      cholecalciferol, vitamin D3, 125 mcg (5,000 unit) Tab Take 5,000 Units by mouth once daily.       cyclobenzaprine (FLEXERIL) 10 MG tablet Take 10 mg by mouth 3 (three) times daily as needed.       dicyclomine (BENTYL) 10 MG capsule Take 10 mg by mouth 3  (three) times daily.       digoxin (LANOXIN) 250 mcg tablet Take 250 mcg by mouth once daily.       furosemide (LASIX) 40 MG tablet Take 40 mg by mouth 2 (two) times daily.       glipiZIDE (GLUCOTROL) 2.5 MG TR24 Take 2.5 mg by mouth daily with breakfast.       hydrocortisone 2.5 % cream APPLY SMALL AMOUNT OF CREAM TO AFFECTED AREA TWICE DAILY FOR 2 WEEKS THEN AS NEEDED      hydrocortisone-iodoquinol-aloe 1.9-1 % CrPk       hydrOXYzine (ATARAX) 25 MG tablet Take 25 mg by mouth 3 (three) times daily as needed.       lifitegrast (XIIDRA) 5 % Dpet Place 1 drop into both eyes 2 (two) times daily. 60 each 11    milnacipran (SAVELLA) 25 mg Tab tablet Take 25 mg by mouth 2 (two) times daily.      multivitamin-iron-folic acid Tab       nystatin (MYCOSTATIN) cream 1 application to affected area      ondansetron (ZOFRAN) 4 MG tablet Take 1 tablet (4 mg total) by mouth every 8 (eight) hours as needed for Nausea. 12 tablet 0    ONE TOUCH ULTRA TEST Strp       ONE TOUCH ULTRAMINI kit       oxybutynin (DITROPAN-XL) 5 MG TR24 Take 5 mg by mouth once daily.      pantoprazole (PROTONIX) 40 MG tablet Take 40 mg by mouth 2 (two) times daily.       tizanidine 4 mg Cap Take 4 mg by mouth 3 (three) times daily as needed.       triamcinolone acetonide 0.1% (KENALOG) 0.1 % cream       vitamin B complex (SUPER B COMPLEX-B-12 ORAL) Take by mouth once daily.       [DISCONTINUED] celecoxib (CELEBREX) 200 MG capsule Take 1 capsule (200 mg total) by mouth once daily. Take with food 30 capsule 2    azelastine-fluticasone (DYMISTA) 137-50 mcg/spray Spry nassal spray 2 sprays by Each Nostril route 2 (two) times daily as needed.  (Patient not taking: Reported on 10/16/2024)      montelukast (SINGULAIR) 10 mg tablet TAKE 1 TABLET BY MOUTH ONCE DAILY FOR 30 DAYS (Patient not taking: Reported on 10/16/2024)      ZOSTAVAX, PF, 19,400 unit/0.65 mL injection  (Patient not taking: Reported on 10/16/2024)       No current facility-administered medications  on file prior to visit.            Objective:            Physical Exam  Constitutional:       Appearance: Normal appearance.   HENT:      Head: Normocephalic and atraumatic.   Eyes:      General: No scleral icterus.     Extraocular Movements: Extraocular movements intact.      Pupils: Pupils are equal, round, and reactive to light.   Pulmonary:      Effort: Pulmonary effort is normal.      Breath sounds: No stridor.   Musculoskeletal:      Comments:  about 1+ ankle edema bilaterally.   Skin:     General: Skin is warm and dry.   Neurological:      General: No focal deficit present.      Mental Status: She is alert and oriented to person, place, and time.   Psychiatric:         Mood and Affect: Mood normal.         Behavior: Behavior normal.       Assessment:       1. Stage 3a chronic kidney disease    2. Primary hypertension    3. Idiopathic gout, unspecified chronicity, unspecified site        Plan:       1. Creatinine remained stable ranging between 1.1 and 1.2 for the past several months.  She had an episode of acute kidney injury secondary to over-diuresis where her creatinine peaked at around 1.7.  After adjustment of her diuretics creatinine has improved back to her usual baseline.  EGFR is normal for her age and does not reflect an underlying renal process.    2. Blood pressure is well controlled on current regimen.    3. She has no symptoms of gout has not had a recent gout flare.      Benson Kimball MD

## 2024-11-05 ENCOUNTER — TELEPHONE (OUTPATIENT)
Dept: NEPHROLOGY | Facility: CLINIC | Age: 79
End: 2024-11-05
Payer: MEDICARE

## 2024-11-05 NOTE — TELEPHONE ENCOUNTER
Returned call to let her know Philippe response to her question.   Yes, she has CKD 3A based strictly on estimated GFR.  Her estimated GFR calculates between 30 and 60 due to age only.  The estimated glomerular filtration rate is only a computer estimate.  It is not a reflection of actual kidney function.   Pt voiced understanding. 11/5/24/sf

## 2024-11-05 NOTE — TELEPHONE ENCOUNTER
"Pt is concerned that dr put ckd3a on her dx in chart and wants to know since her labs have improved to baseline was this actually the dx? If not she wants it out of her chart". Sent to dr day.11/5/24/sf   "

## 2024-11-05 NOTE — TELEPHONE ENCOUNTER
"----- Message from Benson Kimball MD sent at 11/5/2024 11:56 AM CST -----  Contact: Faith  Yes, she has CKD 3A based strictly on estimated GFR.  Her estimated GFR calculates between 30 and 60 due to age only.    The estimated glomerular filtration rate is only a computer estimate.  It is not a reflection of actual kidney function.  ----- Message -----  From: Dai Soria LPN  Sent: 11/5/2024  11:42 AM CST  To: Benson Kimball MD    She wants to know if she actually has ckd 3a after her labs got better Because she had a lot going on at that time. She is concerned about that being in her chart if its not".  ----- Message -----  From: Guerrero Pereyra  Sent: 11/5/2024  11:15 AM CST  To: Rehana Borgess would like a call back at [phone] in regards to needing to speak with nurse about a diagnosis she seen on her chart.   Thanks   Am  "

## 2024-11-05 NOTE — TELEPHONE ENCOUNTER
----- Message from Guerrero sent at 11/5/2024 11:14 AM CST -----  Contact: Faith Cuevas would like a call back at [phone] in regards to needing to speak with nurse about a diagnosis she seen on her chart.   Thanks   Am

## 2024-12-17 DIAGNOSIS — H04.123 DRY EYE SYNDROME, BILATERAL: ICD-10-CM

## 2024-12-17 RX ORDER — LIFITEGRAST 50 MG/ML
1 SOLUTION/ DROPS OPHTHALMIC 2 TIMES DAILY
Qty: 60 EACH | Refills: 0 | Status: SHIPPED | OUTPATIENT
Start: 2024-12-17

## 2025-04-08 DIAGNOSIS — N18.31 STAGE 3A CHRONIC KIDNEY DISEASE: Primary | ICD-10-CM

## 2025-04-08 DIAGNOSIS — I10 PRIMARY HYPERTENSION: ICD-10-CM

## 2025-04-15 ENCOUNTER — LAB VISIT (OUTPATIENT)
Dept: LAB | Facility: HOSPITAL | Age: 80
End: 2025-04-15
Attending: INTERNAL MEDICINE
Payer: MEDICARE

## 2025-04-15 DIAGNOSIS — I10 PRIMARY HYPERTENSION: ICD-10-CM

## 2025-04-15 DIAGNOSIS — N18.31 STAGE 3A CHRONIC KIDNEY DISEASE: ICD-10-CM

## 2025-04-15 LAB
ALBUMIN SERPL BCP-MCNC: 3.7 G/DL (ref 3.5–5.2)
ANION GAP (OHS): 8 MMOL/L (ref 8–16)
BILIRUB UR QL STRIP.AUTO: NEGATIVE
BUN SERPL-MCNC: 23 MG/DL (ref 8–23)
CALCIUM SERPL-MCNC: 9.6 MG/DL (ref 8.7–10.5)
CHLORIDE SERPL-SCNC: 104 MMOL/L (ref 95–110)
CLARITY UR: CLEAR
CO2 SERPL-SCNC: 27 MMOL/L (ref 23–29)
COLOR UR AUTO: ABNORMAL
CREAT SERPL-MCNC: 1.2 MG/DL (ref 0.5–1.4)
CREAT UR-MCNC: 17 MG/DL (ref 15–325)
GFR SERPLBLD CREATININE-BSD FMLA CKD-EPI: 46 ML/MIN/1.73/M2
GLUCOSE SERPL-MCNC: 100 MG/DL (ref 70–110)
GLUCOSE UR QL STRIP: NEGATIVE
HGB UR QL STRIP: NEGATIVE
KETONES UR QL STRIP: NEGATIVE
LEUKOCYTE ESTERASE UR QL STRIP: NEGATIVE
NITRITE UR QL STRIP: NEGATIVE
PH UR STRIP: 6 [PH]
PHOSPHATE SERPL-MCNC: 3.5 MG/DL (ref 2.7–4.5)
POTASSIUM SERPL-SCNC: 4.2 MMOL/L (ref 3.5–5.1)
PROT UR QL STRIP: NEGATIVE
PROT UR-MCNC: <7 MG/DL
PROT/CREAT UR: NORMAL MG/G{CREAT}
SODIUM SERPL-SCNC: 139 MMOL/L (ref 136–145)
SP GR UR STRIP: <=1.005

## 2025-04-15 PROCEDURE — 81003 URINALYSIS AUTO W/O SCOPE: CPT

## 2025-04-15 PROCEDURE — 84156 ASSAY OF PROTEIN URINE: CPT

## 2025-04-15 PROCEDURE — 36415 COLL VENOUS BLD VENIPUNCTURE: CPT

## 2025-04-15 PROCEDURE — 80069 RENAL FUNCTION PANEL: CPT

## 2025-04-22 ENCOUNTER — OFFICE VISIT (OUTPATIENT)
Dept: NEPHROLOGY | Facility: CLINIC | Age: 80
End: 2025-04-22
Payer: MEDICARE

## 2025-04-22 VITALS
BODY MASS INDEX: 33.31 KG/M2 | HEART RATE: 67 BPM | DIASTOLIC BLOOD PRESSURE: 60 MMHG | SYSTOLIC BLOOD PRESSURE: 122 MMHG | RESPIRATION RATE: 18 BRPM | WEIGHT: 181 LBS | HEIGHT: 62 IN

## 2025-04-22 DIAGNOSIS — N18.31 STAGE 3A CHRONIC KIDNEY DISEASE: Primary | ICD-10-CM

## 2025-04-22 DIAGNOSIS — M10.00 IDIOPATHIC GOUT, UNSPECIFIED CHRONICITY, UNSPECIFIED SITE: ICD-10-CM

## 2025-04-22 DIAGNOSIS — I10 PRIMARY HYPERTENSION: ICD-10-CM

## 2025-04-22 PROCEDURE — 99215 OFFICE O/P EST HI 40 MIN: CPT | Mod: PBBFAC | Performed by: INTERNAL MEDICINE

## 2025-04-22 PROCEDURE — 99999 PR PBB SHADOW E&M-EST. PATIENT-LVL V: CPT | Mod: PBBFAC,,, | Performed by: INTERNAL MEDICINE

## 2025-04-22 PROCEDURE — 99214 OFFICE O/P EST MOD 30 MIN: CPT | Mod: S$PBB,,, | Performed by: INTERNAL MEDICINE

## 2025-04-22 RX ORDER — OMEPRAZOLE 40 MG/1
40 CAPSULE, DELAYED RELEASE ORAL 2 TIMES DAILY
COMMUNITY
Start: 2024-11-19

## 2025-04-22 RX ORDER — FEBUXOSTAT 40 MG/1
1 TABLET, FILM COATED ORAL DAILY
COMMUNITY
Start: 2025-01-17

## 2025-04-22 RX ORDER — COLCHICINE 0.6 MG/1
TABLET ORAL
COMMUNITY

## 2025-04-22 RX ORDER — BEMPEDOIC ACID 180 MG/1
1 TABLET, FILM COATED ORAL
COMMUNITY
Start: 2025-02-04

## 2025-04-22 RX ORDER — GLIPIZIDE 2.5 MG/1
2.5 TABLET, EXTENDED RELEASE ORAL
COMMUNITY
Start: 2025-04-07

## 2025-04-22 NOTE — PROGRESS NOTES
"Subjective:       Patient ID: Faith Valadez is a 79 y.o. female.    Chief Complaint:  CKD, hypertension, gout    HPI    She presents to clinic today for routine follow-up.  Since her last office visit she has been doing well and has no specific or new complaints.  Her laboratory studies and medications were reviewed.  All Nephrology related questions were answered to her satisfaction.    Review of Systems   Constitutional: Negative.    HENT: Negative.     Respiratory: Negative.     Cardiovascular: Negative.    Gastrointestinal: Negative.    Genitourinary: Negative.    Musculoskeletal: Negative.    Skin: Negative.        /60   Pulse 67   Resp 18   Ht 5' 2" (1.575 m)   Wt 82.1 kg (181 lb)   BMI 33.10 kg/m²     Lab Results   Component Value Date    WBC 8.90 08/08/2016    HGB 12.2 08/08/2016    HCT 35.7 (L) 08/08/2016    MCV 87 08/08/2016     08/08/2016      BMP  Lab Results   Component Value Date     04/15/2025    K 4.2 04/15/2025     04/15/2025    CO2 27 04/15/2025    BUN 23 04/15/2025    CREATININE 1.2 04/15/2025    CALCIUM 9.6 04/15/2025    ANIONGAP 8 04/15/2025    ESTGFRAFRICA 58.8 (A) 08/08/2016    EGFRNONAA 51.0 (A) 08/08/2016     CMP  Sodium   Date Value Ref Range Status   04/15/2025 139 136 - 145 mmol/L Final   10/09/2024 140 136 - 145 mmol/L Final     Potassium   Date Value Ref Range Status   04/15/2025 4.2 3.5 - 5.1 mmol/L Final   10/09/2024 4.0 3.5 - 5.1 mmol/L Final     Chloride   Date Value Ref Range Status   04/15/2025 104 95 - 110 mmol/L Final   10/09/2024 108 95 - 110 mmol/L Final     CO2   Date Value Ref Range Status   04/15/2025 27 23 - 29 mmol/L Final   10/09/2024 24 23 - 29 mmol/L Final     Glucose   Date Value Ref Range Status   10/09/2024 119 (H) 70 - 110 mg/dL Final     BUN   Date Value Ref Range Status   04/15/2025 23 8 - 23 mg/dL Final     Creatinine   Date Value Ref Range Status   04/15/2025 1.2 0.5 - 1.4 mg/dL Final     Calcium   Date Value Ref Range Status "   04/15/2025 9.6 8.7 - 10.5 mg/dL Final   10/09/2024 9.7 8.7 - 10.5 mg/dL Final     Total Protein   Date Value Ref Range Status   08/08/2016 7.3 6.0 - 8.4 g/dL Final     Albumin   Date Value Ref Range Status   04/15/2025 3.7 3.5 - 5.2 g/dL Final   10/09/2024 3.5 3.5 - 5.2 g/dL Final     Total Bilirubin   Date Value Ref Range Status   08/08/2016 0.3 0.1 - 1.0 mg/dL Final     Comment:     For infants and newborns, interpretation of results should be based  on gestational age, weight and in agreement with clinical  observations.  Premature Infant recommended reference ranges:  Up to 24 hours.............<8.0 mg/dL  Up to 48 hours............<12.0 mg/dL  3-5 days..................<15.0 mg/dL  6-29 days.................<15.0 mg/dL       Alkaline Phosphatase   Date Value Ref Range Status   08/08/2016 65 55 - 135 U/L Final     AST   Date Value Ref Range Status   08/08/2016 20 10 - 40 U/L Final     ALT   Date Value Ref Range Status   08/08/2016 19 10 - 44 U/L Final     Anion Gap   Date Value Ref Range Status   04/15/2025 8 8 - 16 mmol/L Final     eGFR if    Date Value Ref Range Status   08/08/2016 58.8 (A) >60 mL/min/1.73 m^2 Final     eGFR if non    Date Value Ref Range Status   08/08/2016 51.0 (A) >60 mL/min/1.73 m^2 Final     Comment:     Calculation used to obtain the estimated glomerular filtration  rate (eGFR) is the CKD-EPI equation. Since race is unknown   in our information system, the eGFR values for   -American and Non--American patients are given   for each creatinine result.              Objective:            Physical Exam  Constitutional:       Appearance: Normal appearance.   HENT:      Head: Normocephalic and atraumatic.   Eyes:      General: No scleral icterus.     Extraocular Movements: Extraocular movements intact.      Pupils: Pupils are equal, round, and reactive to light.   Pulmonary:      Effort: Pulmonary effort is normal.      Breath sounds: No stridor.    Musculoskeletal:      Right lower leg: No edema.      Left lower leg: No edema.   Skin:     General: Skin is warm and dry.   Neurological:      General: No focal deficit present.      Mental Status: She is alert and oriented to person, place, and time.   Psychiatric:         Mood and Affect: Mood normal.         Behavior: Behavior normal.       Assessment:       1. Stage 3a chronic kidney disease    2. Primary hypertension    3. Idiopathic gout, unspecified chronicity, unspecified site        Plan:       1. Creatinine has remained relatively stable at 1.2.  She has ranged between 1.2 and 1.3 for the past several years.  Urinalysis is bland without evidence of proteinuria.    2. Blood pressure is well controlled on current regimen.    3. She is stable on Uloric.  She has not had a gout flare in quite some time.  Will check a uric acid level prior to her next office visit.      Benson Kimball MD

## 2025-06-11 ENCOUNTER — TELEPHONE (OUTPATIENT)
Dept: OPHTHALMOLOGY | Facility: CLINIC | Age: 80
End: 2025-06-11
Payer: MEDICARE

## 2025-06-11 NOTE — TELEPHONE ENCOUNTER
----- Message from Sara sent at 6/11/2025  4:53 PM CDT -----  Regarding: SERG SANTOS [[id number]]                                                Reschedule Appointment Patient Name:SERG SANTOS [[id number]]  Original Date Of Appt: 06/03/25    Preferred Date:as soon as possible (patient also needs to reschedule her visual - one appointment)  Contact Info:[phone]  Additional Info:pleas  call patient to arrange. Patient has new  pair of glasses that she still cannot see out of.

## 2025-06-17 ENCOUNTER — OFFICE VISIT (OUTPATIENT)
Dept: OPHTHALMOLOGY | Facility: CLINIC | Age: 80
End: 2025-06-17
Payer: MEDICARE

## 2025-06-17 DIAGNOSIS — E11.9 TYPE 2 DIABETES MELLITUS WITHOUT COMPLICATION, WITHOUT LONG-TERM CURRENT USE OF INSULIN: Primary | ICD-10-CM

## 2025-06-17 DIAGNOSIS — H40.013 OPEN ANGLE WITH BORDERLINE FINDINGS, LOW RISK, BILATERAL: ICD-10-CM

## 2025-06-17 DIAGNOSIS — H35.371 EPIRETINAL MEMBRANE, RIGHT: ICD-10-CM

## 2025-06-17 PROCEDURE — 99214 OFFICE O/P EST MOD 30 MIN: CPT | Mod: S$PBB,,, | Performed by: OPHTHALMOLOGY

## 2025-06-17 PROCEDURE — 92134 CPTRZ OPH DX IMG PST SGM RTA: CPT | Mod: PBBFAC | Performed by: OPHTHALMOLOGY

## 2025-06-17 PROCEDURE — 99212 OFFICE O/P EST SF 10 MIN: CPT | Mod: PBBFAC | Performed by: OPHTHALMOLOGY

## 2025-06-17 PROCEDURE — 99999 PR PBB SHADOW E&M-EST. PATIENT-LVL II: CPT | Mod: PBBFAC,,, | Performed by: OPHTHALMOLOGY

## 2025-06-17 NOTE — PROGRESS NOTES
===============================  Date today is 6/17/2025  Faith Valadez is a 79 y.o. female  Last visit [facility]: :7/15/2024   Last visit eye dept. Visit date not found    Corrected distance visual acuity was 20/30 -2 in the right eye and 20/25 -2 in the left eye.  Tonometry       Tonometry ([facility], 1:42 PM)         Right Left    Pressure 12 12                  Wearing Rx       Wearing Rx         Sphere Cylinder Axis Add    Right -2.25 +1.50 094 +2.50    Left -1.50 +1.00 085 +2.50      Type: Bifocal                  Manifest Refraction       Manifest Refraction (Auto)         Sphere Cylinder Axis    Right -2.50 +2.00 090    Left -2.25 +2.00 070                  Not recorded       Chief Complaint   Patient presents with    Diabetes     HPI    Pt reports for diabetes. No pain or irritation. Va stable.    1. NS OS   PCIOL OD +19.5 SN60WF (-1.00) 8-     --Dm x since 2009   --erm od (per Dr. Lo If ever has ERM peel would be best to have cat   surg & iol 1st.)   Saw Dr. Lo also 2007 and patient decided against surgery   --Dry eyes   Punctal plugs ([facility]) 6/16/14   restasis   --Ns OS  PCIOL OD per patient  --pvd ou   --S. COAG based on ^c:d all diagnostics wnl (previously by Dr. Breaux)   Phys. Cupping +Fhx father   cct 537/ 540   HVF 11/26/10   tmax 20 OU     S/p 25g ppv/ilm peel/afx od for erm 11/13/2019 / dr daryl Tinoco OU   Xiidra OU BID     Last edited by Iman Solis on 6/17/2025  1:43 PM.      Problem List Items Addressed This Visit          Eye/Vision problems    Type 2 diabetes mellitus without complication - Primary    Relevant Orders    Posterior Segment OCT Retina-Both eyes (Completed)     Other Visit Diagnoses         Open angle with borderline findings, low risk, bilateral          Epiretinal membrane, right        Relevant Orders    Posterior Segment OCT Retina-Both eyes (Completed)          Instructed to call 24/7 for any worsening of vision, visual distortion or pain.  Check OU  independently daily.    Gave my office and personal cell phone number.  ________________  6/17/2025 today  Faith Valadez      DM no retinopathy  OCT stable ERM OD, OS stable    PCIOL OD  Minimal NS OS  C/d 0.7  SCOAG based on c/d  IOP ok  Disc pallor  Stable ERM  OS macula looks good  No macula degeneration    RTC 1 year with 24-2 VF, dilation, MOCT  Instructed to call 24/7 for any worsening of vision or symptoms. Check OU daily.   Gave my office and cell phone number.    =============================

## (undated) DEVICE — DRESSING EYE OVAL LF

## (undated) DEVICE — FORCEP GRIESHABER MAXGRIP 25G

## (undated) DEVICE — SHIELD EYE METAL FOX 50/BX